# Patient Record
Sex: FEMALE | Race: WHITE | Employment: OTHER | URBAN - METROPOLITAN AREA
[De-identification: names, ages, dates, MRNs, and addresses within clinical notes are randomized per-mention and may not be internally consistent; named-entity substitution may affect disease eponyms.]

---

## 2017-01-11 ENCOUNTER — OFFICE VISIT (OUTPATIENT)
Dept: FAMILY MEDICINE CLINIC | Facility: CLINIC | Age: 77
End: 2017-01-11

## 2017-01-11 VITALS
WEIGHT: 166.38 LBS | HEART RATE: 72 BPM | HEIGHT: 57 IN | RESPIRATION RATE: 16 BRPM | BODY MASS INDEX: 35.9 KG/M2 | DIASTOLIC BLOOD PRESSURE: 70 MMHG | TEMPERATURE: 98 F | SYSTOLIC BLOOD PRESSURE: 102 MMHG

## 2017-01-11 DIAGNOSIS — Z00.00 LABORATORY EXAMINATION ORDERED AS PART OF A ROUTINE GENERAL MEDICAL EXAMINATION: ICD-10-CM

## 2017-01-11 DIAGNOSIS — M17.12 PRIMARY LOCALIZED OSTEOARTHROSIS, LOWER LEG, LEFT: ICD-10-CM

## 2017-01-11 DIAGNOSIS — R73.03 PREDIABETES: ICD-10-CM

## 2017-01-11 DIAGNOSIS — M17.0 PRIMARY OSTEOARTHRITIS OF BOTH KNEES: Primary | ICD-10-CM

## 2017-01-11 DIAGNOSIS — F41.9 ANXIETY: ICD-10-CM

## 2017-01-11 PROCEDURE — 99213 OFFICE O/P EST LOW 20 MIN: CPT | Performed by: INTERNAL MEDICINE

## 2017-01-11 RX ORDER — ALPRAZOLAM 0.5 MG/1
TABLET ORAL
Qty: 90 TABLET | Refills: 2 | Status: SHIPPED | COMMUNITY
Start: 2017-02-10 | End: 2017-05-09

## 2017-01-11 RX ORDER — MELOXICAM 15 MG/1
15 TABLET ORAL DAILY
Qty: 30 TABLET | Refills: 1 | Status: SHIPPED | COMMUNITY
Start: 2017-01-11 | End: 2017-02-27

## 2017-01-11 NOTE — PROGRESS NOTES
CC: Patient presents with:  Arthritis: joint pain, took Diclofenac and is not working       HPI:     Having a lot of pain in her knees, shoulders and arms and back. States pain is 10/10.    Occurs daily , worse with exertion   States the diclofenac do developed, well nourished,in no apparent distress, A/O x3  SKIN: no rashes,no suspicious lesions  HEENT: atraumatic, normocephalic, OP-clear, PERRLA  NECK: supple,no adenopathy  LUNGS: clear to auscultation bilaterally   CARDIO: RRR without murmur  GI: goo patient indicates understanding of these issues and agrees to the plan. Return in about 3 months (around 4/11/2017).

## 2017-01-12 ENCOUNTER — TELEPHONE (OUTPATIENT)
Dept: FAMILY MEDICINE CLINIC | Facility: CLINIC | Age: 77
End: 2017-01-12

## 2017-01-12 ENCOUNTER — LAB ENCOUNTER (OUTPATIENT)
Dept: LAB | Age: 77
End: 2017-01-12
Attending: INTERNAL MEDICINE
Payer: MEDICARE

## 2017-01-12 DIAGNOSIS — R73.03 PREDIABETES: ICD-10-CM

## 2017-01-12 DIAGNOSIS — Z00.00 LABORATORY EXAMINATION ORDERED AS PART OF A ROUTINE GENERAL MEDICAL EXAMINATION: ICD-10-CM

## 2017-01-12 LAB
ALBUMIN SERPL-MCNC: 3.9 G/DL (ref 3.5–4.8)
ALP LIVER SERPL-CCNC: 94 U/L (ref 55–142)
ALT SERPL-CCNC: 30 U/L (ref 14–54)
AST SERPL-CCNC: 23 U/L (ref 15–41)
BASOPHILS # BLD AUTO: 0.05 X10(3) UL (ref 0–0.1)
BASOPHILS NFR BLD AUTO: 0.8 %
BILIRUB SERPL-MCNC: 1.1 MG/DL (ref 0.1–2)
BUN BLD-MCNC: 15 MG/DL (ref 8–20)
CALCIUM BLD-MCNC: 9.4 MG/DL (ref 8.3–10.3)
CHLORIDE: 104 MMOL/L (ref 101–111)
CHOLEST SMN-MCNC: 215 MG/DL (ref ?–200)
CO2: 30 MMOL/L (ref 22–32)
CREAT BLD-MCNC: 0.73 MG/DL (ref 0.55–1.02)
EOSINOPHIL # BLD AUTO: 0.24 X10(3) UL (ref 0–0.3)
EOSINOPHIL NFR BLD AUTO: 3.9 %
ERYTHROCYTE [DISTWIDTH] IN BLOOD BY AUTOMATED COUNT: 12.2 % (ref 11.5–16)
EST. AVERAGE GLUCOSE BLD GHB EST-MCNC: 126 MG/DL (ref 68–126)
GLUCOSE BLD-MCNC: 113 MG/DL (ref 70–99)
HBA1C MFR BLD HPLC: 6 % (ref ?–5.7)
HCT VFR BLD AUTO: 41.5 % (ref 34–50)
HDLC SERPL-MCNC: 56 MG/DL (ref 45–?)
HDLC SERPL: 3.84 {RATIO} (ref ?–4.44)
HGB BLD-MCNC: 13.9 G/DL (ref 12–16)
IMMATURE GRANULOCYTE COUNT: 0.02 X10(3) UL (ref 0–1)
IMMATURE GRANULOCYTE RATIO %: 0.3 %
LDLC SERPL CALC-MCNC: 131 MG/DL (ref ?–130)
LYMPHOCYTES # BLD AUTO: 1.84 X10(3) UL (ref 0.9–4)
LYMPHOCYTES NFR BLD AUTO: 29.8 %
M PROTEIN MFR SERPL ELPH: 7.5 G/DL (ref 6.1–8.3)
MCH RBC QN AUTO: 31.2 PG (ref 27–33.2)
MCHC RBC AUTO-ENTMCNC: 33.5 G/DL (ref 31–37)
MCV RBC AUTO: 93.3 FL (ref 81–100)
MONOCYTES # BLD AUTO: 0.45 X10(3) UL (ref 0.1–0.6)
MONOCYTES NFR BLD AUTO: 7.3 %
NEUTROPHIL ABS PRELIM: 3.58 X10 (3) UL (ref 1.3–6.7)
NEUTROPHILS # BLD AUTO: 3.58 X10(3) UL (ref 1.3–6.7)
NEUTROPHILS NFR BLD AUTO: 57.9 %
NONHDLC SERPL-MCNC: 159 MG/DL (ref ?–130)
PLATELET # BLD AUTO: 255 10(3)UL (ref 150–450)
POTASSIUM SERPL-SCNC: 4.3 MMOL/L (ref 3.6–5.1)
RBC # BLD AUTO: 4.45 X10(6)UL (ref 3.8–5.1)
RED CELL DISTRIBUTION WIDTH-SD: 42.2 FL (ref 35.1–46.3)
SODIUM SERPL-SCNC: 141 MMOL/L (ref 136–144)
TRIGLYCERIDES: 139 MG/DL (ref ?–150)
VLDL: 28 MG/DL (ref 5–40)
WBC # BLD AUTO: 6.2 X10(3) UL (ref 4–13)

## 2017-01-12 PROCEDURE — 83036 HEMOGLOBIN GLYCOSYLATED A1C: CPT

## 2017-01-12 PROCEDURE — 80053 COMPREHEN METABOLIC PANEL: CPT

## 2017-01-12 PROCEDURE — 80061 LIPID PANEL: CPT

## 2017-01-12 PROCEDURE — 36415 COLL VENOUS BLD VENIPUNCTURE: CPT

## 2017-01-12 PROCEDURE — 85025 COMPLETE CBC W/AUTO DIFF WBC: CPT

## 2017-01-13 NOTE — PROGRESS NOTES
Quick Note:    HgbA1C is stable  Elevated cholesterol  Will discuss at upcoming [address]  ______

## 2017-01-19 ENCOUNTER — TELEPHONE (OUTPATIENT)
Dept: FAMILY MEDICINE CLINIC | Facility: CLINIC | Age: 77
End: 2017-01-19

## 2017-01-19 ENCOUNTER — LAB ENCOUNTER (OUTPATIENT)
Dept: LAB | Age: 77
End: 2017-01-19
Attending: INTERNAL MEDICINE
Payer: MEDICARE

## 2017-01-19 DIAGNOSIS — R30.0 BURNING WITH URINATION: Primary | ICD-10-CM

## 2017-01-19 DIAGNOSIS — R30.0 BURNING WITH URINATION: ICD-10-CM

## 2017-01-19 LAB
BILIRUB UR QL STRIP.AUTO: NEGATIVE
GLUCOSE UR STRIP.AUTO-MCNC: NEGATIVE MG/DL
KETONES UR STRIP.AUTO-MCNC: NEGATIVE MG/DL
NITRITE UR QL STRIP.AUTO: POSITIVE
PH UR STRIP.AUTO: 5 [PH] (ref 4.5–8)
PROT UR STRIP.AUTO-MCNC: NEGATIVE MG/DL
RBC UR QL AUTO: NEGATIVE
SP GR UR STRIP.AUTO: 1.02 (ref 1–1.03)
UROBILINOGEN UR STRIP.AUTO-MCNC: <2 MG/DL
WBC #/AREA URNS AUTO: >50 /HPF
WBC CLUMPS UR QL AUTO: PRESENT

## 2017-01-19 PROCEDURE — 87186 SC STD MICRODIL/AGAR DIL: CPT

## 2017-01-19 PROCEDURE — 87088 URINE BACTERIA CULTURE: CPT

## 2017-01-19 PROCEDURE — 81001 URINALYSIS AUTO W/SCOPE: CPT

## 2017-01-19 PROCEDURE — 87086 URINE CULTURE/COLONY COUNT: CPT

## 2017-01-19 NOTE — TELEPHONE ENCOUNTER
Patient's  is requesting urine test for s/s of UTI including burning with urination. No other symptoms reported. Patient was in office for general labs this AM and urine was collected.  Because urine was previously collected patients  declined

## 2017-01-19 NOTE — TELEPHONE ENCOUNTER
Pt came in and stated that she would like to have her urine tested. There's no order in the system and pt would like to have this test done. Please advise.  Thank you

## 2017-01-20 ENCOUNTER — TELEPHONE (OUTPATIENT)
Dept: FAMILY MEDICINE CLINIC | Facility: CLINIC | Age: 77
End: 2017-01-20

## 2017-01-20 ENCOUNTER — OFFICE VISIT (OUTPATIENT)
Dept: FAMILY MEDICINE CLINIC | Facility: CLINIC | Age: 77
End: 2017-01-20

## 2017-01-20 VITALS
HEIGHT: 57 IN | TEMPERATURE: 98 F | RESPIRATION RATE: 16 BRPM | WEIGHT: 166 LBS | SYSTOLIC BLOOD PRESSURE: 130 MMHG | DIASTOLIC BLOOD PRESSURE: 82 MMHG | BODY MASS INDEX: 35.81 KG/M2 | HEART RATE: 78 BPM

## 2017-01-20 DIAGNOSIS — N30.00 ACUTE CYSTITIS WITHOUT HEMATURIA: Primary | ICD-10-CM

## 2017-01-20 PROCEDURE — 99213 OFFICE O/P EST LOW 20 MIN: CPT | Performed by: PHYSICIAN ASSISTANT

## 2017-01-20 RX ORDER — NITROFURANTOIN 25; 75 MG/1; MG/1
100 CAPSULE ORAL 2 TIMES DAILY
Qty: 14 CAPSULE | Refills: 0 | Status: SHIPPED | OUTPATIENT
Start: 2017-01-20 | End: 2017-01-31 | Stop reason: ALTCHOICE

## 2017-01-20 RX ORDER — ESCITALOPRAM OXALATE 10 MG/1
1 TABLET ORAL DAILY
Refills: 3 | COMMUNITY
Start: 2017-01-16 | End: 2017-01-31 | Stop reason: DRUGHIGH

## 2017-01-20 RX ORDER — NITROFURANTOIN 25; 75 MG/1; MG/1
100 CAPSULE ORAL 2 TIMES DAILY
Qty: 14 CAPSULE | Refills: 0 | Status: SHIPPED | OUTPATIENT
Start: 2017-01-20 | End: 2017-01-20

## 2017-01-20 RX ORDER — DONEPEZIL HYDROCHLORIDE 5 MG/1
1 TABLET, FILM COATED ORAL DAILY
Refills: 0 | COMMUNITY
Start: 2017-01-17 | End: 2017-05-09

## 2017-01-20 NOTE — PATIENT INSTRUCTIONS
Thank you for choosing Bandar Castaneda PA-C at [place] [address]  To Do: Maya Mcfadden  1. Pt to begin antibiotics as prescribed  2. Start probiotic, VSL #3 or Align  3.  Follow-up if symptoms persist or increase    • Please signup for MY CHART, wh strive to make you healthier and to improve your quality of life.     Referrals, and Radiology Information:    If your insurance requires a referral to a specialist, please allow 5 business days to process your referral request.    If George Kirby PA-C

## 2017-01-20 NOTE — TELEPHONE ENCOUNTER
Coleen Brantley informed of what was prescribed at the visit today and that patient should also take a probiotic. Coleen Brantley will make sure to get probiotics for his mother to take.

## 2017-01-20 NOTE — PROGRESS NOTES
[facility] Internal Medicine Progress Note    CC:  Patient presents with:  Lab Results      HPI:   HPI  Urinary problems  For the past 4-5 days she has had some burning and frequency of urination  Denies any f/c/n/v/flank pain  Has not tried any murmur heard. Pulmonary/Chest: Effort normal and breath sounds normal. No respiratory distress. She has no wheezes. She has no rales. Abdominal: Soft. Bowel sounds are normal. She exhibits no distension and no mass. There is no tenderness.  There is no r

## 2017-01-22 NOTE — PROGRESS NOTES
Quick Note:    + bacteria  Pt to continue medications as prescribed and f/u if symptoms persist or increase  ______

## 2017-01-31 ENCOUNTER — OFFICE VISIT (OUTPATIENT)
Dept: FAMILY MEDICINE CLINIC | Facility: CLINIC | Age: 77
End: 2017-01-31

## 2017-01-31 ENCOUNTER — LAB ENCOUNTER (OUTPATIENT)
Dept: LAB | Age: 77
End: 2017-01-31
Attending: INTERNAL MEDICINE
Payer: MEDICARE

## 2017-01-31 VITALS
HEART RATE: 72 BPM | WEIGHT: 167.19 LBS | TEMPERATURE: 97 F | DIASTOLIC BLOOD PRESSURE: 70 MMHG | BODY MASS INDEX: 36.07 KG/M2 | SYSTOLIC BLOOD PRESSURE: 118 MMHG | HEIGHT: 57 IN | RESPIRATION RATE: 16 BRPM

## 2017-01-31 DIAGNOSIS — M17.12 PRIMARY LOCALIZED OSTEOARTHROSIS, LOWER LEG, LEFT: Primary | ICD-10-CM

## 2017-01-31 DIAGNOSIS — N30.00 ACUTE CYSTITIS WITHOUT HEMATURIA: ICD-10-CM

## 2017-01-31 DIAGNOSIS — Z09 FOLLOW-UP EXAM: ICD-10-CM

## 2017-01-31 DIAGNOSIS — M17.0 PRIMARY OSTEOARTHRITIS OF BOTH KNEES: ICD-10-CM

## 2017-01-31 LAB
BILIRUB UR QL STRIP.AUTO: NEGATIVE
GLUCOSE UR STRIP.AUTO-MCNC: NEGATIVE MG/DL
KETONES UR STRIP.AUTO-MCNC: NEGATIVE MG/DL
NITRITE UR QL STRIP.AUTO: NEGATIVE
PH UR STRIP.AUTO: 6 [PH] (ref 4.5–8)
PROT UR STRIP.AUTO-MCNC: NEGATIVE MG/DL
RBC UR QL AUTO: NEGATIVE
SP GR UR STRIP.AUTO: 1.02 (ref 1–1.03)
UROBILINOGEN UR STRIP.AUTO-MCNC: <2 MG/DL

## 2017-01-31 PROCEDURE — 87086 URINE CULTURE/COLONY COUNT: CPT

## 2017-01-31 PROCEDURE — 81001 URINALYSIS AUTO W/SCOPE: CPT

## 2017-01-31 PROCEDURE — 99213 OFFICE O/P EST LOW 20 MIN: CPT | Performed by: INTERNAL MEDICINE

## 2017-01-31 RX ORDER — HYDROCODONE BITARTRATE AND ACETAMINOPHEN 5; 325 MG/1; MG/1
1 TABLET ORAL EVERY 6 HOURS PRN
Qty: 20 TABLET | Refills: 0 | Status: SHIPPED | OUTPATIENT
Start: 2017-01-31 | End: 2017-05-09

## 2017-01-31 RX ORDER — ESCITALOPRAM OXALATE 5 MG/1
5 TABLET ORAL DAILY
COMMUNITY
End: 2020-08-17

## 2017-01-31 NOTE — PROGRESS NOTES
CC: Patient presents with:  Medication Follow-Up: Meloxicam works on and off - Patient declined flu vaccine. HPI:     Here for follow up, continues to struggle with significant knee pain. Continues to get recurrent swelling of that knee.  Does not abdominal pain    EXAM:   /70 mmHg  Pulse 72  Temp(Src) 97.4 °F (36.3 °C) (Temporal)  Resp 16  Ht 57\"  Wt 167 lb 3.2 oz  BMI 36.17 kg/m2  GENERAL: well developed, well nourished,in no apparent distress, A/O x3  SKIN: no rashes,no suspicious lesions

## 2017-03-01 NOTE — TELEPHONE ENCOUNTER
Requesting meloxicam   LOV: 1/31/17  RTC: 3 months  Last Labs:   Filled: 1/11/17 #30 with 1 refills    No future appointments.

## 2017-03-02 RX ORDER — MELOXICAM 15 MG/1
TABLET ORAL
Qty: 30 TABLET | Refills: 2 | Status: SHIPPED | OUTPATIENT
Start: 2017-03-02 | End: 2017-06-19

## 2017-04-01 ENCOUNTER — HOSPITAL ENCOUNTER (EMERGENCY)
Age: 77
Discharge: HOME | End: 2017-04-01
Attending: EMERGENCY MEDICINE
Payer: MEDICARE

## 2017-04-01 VITALS
SYSTOLIC BLOOD PRESSURE: 144 MMHG | HEIGHT: 60 IN | BODY MASS INDEX: 32.79 KG/M2 | WEIGHT: 167 LBS | OXYGEN SATURATION: 96 % | HEART RATE: 72 BPM | RESPIRATION RATE: 18 BRPM | TEMPERATURE: 98 F | DIASTOLIC BLOOD PRESSURE: 62 MMHG

## 2017-04-01 DIAGNOSIS — N30.00 ACUTE CYSTITIS WITHOUT HEMATURIA: Primary | ICD-10-CM

## 2017-04-01 PROCEDURE — 99283 EMERGENCY DEPT VISIT LOW MDM: CPT

## 2017-04-01 PROCEDURE — 87086 URINE CULTURE/COLONY COUNT: CPT | Performed by: EMERGENCY MEDICINE

## 2017-04-01 PROCEDURE — 81001 URINALYSIS AUTO W/SCOPE: CPT | Performed by: EMERGENCY MEDICINE

## 2017-04-01 RX ORDER — CEPHALEXIN 500 MG/1
500 CAPSULE ORAL 2 TIMES DAILY
Qty: 14 CAPSULE | Refills: 0 | Status: SHIPPED | OUTPATIENT
Start: 2017-04-01 | End: 2017-04-08

## 2017-04-01 NOTE — ED PROVIDER NOTES
Patient Seen in: THE [facility] In [place]    History   Patient presents with:  Urinary Symptoms (urologic)    Stated Complaint: uti s/s    HPI    66-year-old female here with her  for evaluation of dysuria for the last 3 days.   No Alcohol Use: No                Review of Systems    Positive for stated complaint: uti s/s  Other systems are as noted in HPI. Constitutional and vital signs reviewed. All other systems reviewed and negative except as noted above.     PSFH element limits   URINE CULTURE, ROUTINE       MDM   Well-appearing, well-hydrated, tolerating p.o., no abdominal tenderness. UA does suggest infection. She has been here several times for this.   I did review prior cultures, and I will place her on Keflex for 7 d

## 2017-04-24 ENCOUNTER — TELEPHONE (OUTPATIENT)
Dept: FAMILY MEDICINE CLINIC | Facility: CLINIC | Age: 77
End: 2017-04-24

## 2017-04-24 DIAGNOSIS — Z12.31 ENCOUNTER FOR SCREENING MAMMOGRAM FOR MALIGNANT NEOPLASM OF BREAST: Primary | ICD-10-CM

## 2017-04-24 NOTE — TELEPHONE ENCOUNTER
Requesting mammogram  LOV: 1/31/17  RTC: 3 months  Last Labs: 5/17/16 last mammo    No future appointments. Notes Recorded by Carlene Garcia MD on 5/17/2016 at 4:49 PM  Normal mammogram  Follow up 1 year.     Patient's family member informed order is placed

## 2017-05-05 RX ORDER — ALPRAZOLAM 0.5 MG/1
TABLET ORAL
Qty: 90 TABLET | Refills: 2 | Status: CANCELLED | OUTPATIENT
Start: 2017-05-05

## 2017-05-05 NOTE — TELEPHONE ENCOUNTER
Requesting Alprazolam  LOV: 1/3/17  RTC: 3 months  Last Labs: n/  Filled: 2/10/17 #90 with 2 refills    Future Appointments  Date Time Provider Shayy Rajput   5/16/2017 7:40 AM PF [facility] RM1 PF [facility]       Last filled 4/8/17 #90 for 30 days fo

## 2017-05-09 NOTE — PROGRESS NOTES
CC: Patient presents with:  Medication Follow-Up: Alprazolam       HPI:     Arthritis   Struggling with arthritis pain   Takes meloxicam most days   Some days needs additional tylenol       Anxiety   Struggling with anxiety and still feels that she nee pain    EXAM:   /62 mmHg  Pulse 80  Temp(Src) 97.7 °F (36.5 °C) (Temporal)  Resp 16  Ht 57\"  Wt 166 lb 6.4 oz  BMI 36.00 kg/m2  GENERAL: well developed, well nourished,in no apparent distress, A/O x3  SKIN: no rashes,no suspicious lesions  HEENT: at medication    Gastroesophageal reflux disease without esophagitis  -continue pantoprazole   -advised of risks of long ter use of this medication     Primary osteoarthritis of both knees  -continue meloxicam   -advised of risks of long term use of this medi

## 2017-05-16 ENCOUNTER — HOSPITAL ENCOUNTER (OUTPATIENT)
Dept: MAMMOGRAPHY | Age: 77
Discharge: HOME | End: 2017-05-16
Attending: INTERNAL MEDICINE
Payer: MEDICARE

## 2017-05-16 DIAGNOSIS — Z12.31 ENCOUNTER FOR SCREENING MAMMOGRAM FOR MALIGNANT NEOPLASM OF BREAST: Primary | ICD-10-CM

## 2017-05-16 DIAGNOSIS — Z12.31 ENCOUNTER FOR SCREENING MAMMOGRAM FOR MALIGNANT NEOPLASM OF BREAST: ICD-10-CM

## 2017-05-16 PROCEDURE — 77067 SCR MAMMO BI INCL CAD: CPT | Performed by: INTERNAL MEDICINE

## 2017-06-19 ENCOUNTER — TELEPHONE (OUTPATIENT)
Dept: FAMILY MEDICINE CLINIC | Facility: CLINIC | Age: 77
End: 2017-06-19

## 2017-06-19 RX ORDER — MELOXICAM 15 MG/1
15 TABLET ORAL
Qty: 90 TABLET | Refills: 3 | Status: SHIPPED | OUTPATIENT
Start: 2017-06-19 | End: 2017-07-11

## 2017-07-11 ENCOUNTER — OFFICE VISIT (OUTPATIENT)
Dept: FAMILY MEDICINE CLINIC | Facility: CLINIC | Age: 77
End: 2017-07-11

## 2017-07-11 VITALS
HEIGHT: 57 IN | RESPIRATION RATE: 16 BRPM | DIASTOLIC BLOOD PRESSURE: 76 MMHG | HEART RATE: 80 BPM | BODY MASS INDEX: 36.68 KG/M2 | WEIGHT: 170 LBS | TEMPERATURE: 98 F | SYSTOLIC BLOOD PRESSURE: 140 MMHG

## 2017-07-11 DIAGNOSIS — R06.02 SHORTNESS OF BREATH: ICD-10-CM

## 2017-07-11 DIAGNOSIS — M79.89 LEG SWELLING: Primary | ICD-10-CM

## 2017-07-11 DIAGNOSIS — R10.13 EPIGASTRIC PAIN: ICD-10-CM

## 2017-07-11 PROCEDURE — 99214 OFFICE O/P EST MOD 30 MIN: CPT | Performed by: PHYSICIAN ASSISTANT

## 2017-07-11 NOTE — PROGRESS NOTES
[facility] Internal Medicine Progress Note    CC:  Patient presents with:  Edema: both feet x couple weeks       HPI:   HPI  Swelling in both feet for a couple of weeks  Pt states sometimes the swelling is better, other days it is worse  Elevatin Cuff Size: adult)   Pulse 80   Temp 97.8 °F (36.6 °C) (Temporal)   Resp 16   Ht 57\"   Wt 170 lb   BMI 36.79 kg/m²  Body mass index is 36.79 kg/m².   Physical Exam   Constitutional: She is oriented to person, place, and time and well-developed, well-nourish (CPT=93306)  US ABDOMEN COMPLETE (CPT=76700)  US VENOUS DOPPLER LEG BILAT - DIAG IMG (CPT=93970)     Patient/Caregiver Education: Patient/Caregiver Education: There are no barriers to learning. Medical education done.  Outcome: Patient verbalizes understand

## 2017-07-11 NOTE — PATIENT INSTRUCTIONS
Thank you for choosing Susan Gamboa PA-C at [place] [address]  To Do: Maya Mcfadden  1. Pt to get lab work done  2. Pt to get imaging done  3.  Follow-up in 1 month, sooner if problems  Effective 6/19/17 until November 2017  Due to Construction have potential risk of harm or side effects or medication interactions.  It is your duty and for your safety to discuss with the pharmacist and our office with questions, and to notify us and stop treatment if problems arise, but know that our intention is

## 2017-07-12 ENCOUNTER — TELEPHONE (OUTPATIENT)
Dept: FAMILY MEDICINE CLINIC | Facility: CLINIC | Age: 77
End: 2017-07-12

## 2017-07-12 DIAGNOSIS — M79.89 LEG SWELLING: Primary | ICD-10-CM

## 2017-07-12 DIAGNOSIS — R06.02 SHORTNESS OF BREATH: ICD-10-CM

## 2017-07-12 NOTE — TELEPHONE ENCOUNTER
Future Appointments  Date Time Provider Shayy Rajput   7/13/2017 9:00 AM PF [facility] PF OUTPT LAB [place]   7/13/2017 9:30 AM PF US RM3 PF US [place]   7/13/2017 10:30 AM PF US RM3 PF US [place]   8/3/2017 7:00 AM PF CARD STRESS ECHO RM 1 PF

## 2017-07-12 NOTE — TELEPHONE ENCOUNTER
Alma Cha called back. Patient's spouse will be driving her to testing. Need to keep it in [place]. I explained we will talk to the provider and see if we can change order to STAT to expedite.    She said we are to call her father to set up - he will be a

## 2017-07-13 ENCOUNTER — HOSPITAL ENCOUNTER (OUTPATIENT)
Dept: ULTRASOUND IMAGING | Age: 77
Discharge: HOME | End: 2017-07-13
Attending: PHYSICIAN ASSISTANT
Payer: MEDICARE

## 2017-07-13 ENCOUNTER — LAB ENCOUNTER (OUTPATIENT)
Dept: LAB | Age: 77
End: 2017-07-13
Attending: PHYSICIAN ASSISTANT
Payer: MEDICARE

## 2017-07-13 ENCOUNTER — PRIOR ORIGINAL RECORDS (OUTPATIENT)
Dept: OTHER | Age: 77
End: 2017-07-13

## 2017-07-13 DIAGNOSIS — R06.02 SHORTNESS OF BREATH: ICD-10-CM

## 2017-07-13 DIAGNOSIS — Z13.220 SCREENING FOR LIPID DISORDERS: ICD-10-CM

## 2017-07-13 DIAGNOSIS — R73.03 PREDIABETES: ICD-10-CM

## 2017-07-13 DIAGNOSIS — M79.89 LEG SWELLING: ICD-10-CM

## 2017-07-13 DIAGNOSIS — R10.13 EPIGASTRIC PAIN: ICD-10-CM

## 2017-07-13 LAB
ALBUMIN SERPL-MCNC: 3.8 G/DL (ref 3.5–4.8)
ALP LIVER SERPL-CCNC: 82 U/L (ref 55–142)
ALT SERPL-CCNC: 27 U/L (ref 14–54)
AST SERPL-CCNC: 25 U/L (ref 15–41)
BASOPHILS # BLD AUTO: 0.05 X10(3) UL (ref 0–0.1)
BASOPHILS NFR BLD AUTO: 1 %
BETA NATRIURETIC PEPTIDE: 109 PG/ML (ref 2–99)
BILIRUB SERPL-MCNC: 1.4 MG/DL (ref 0.1–2)
BUN BLD-MCNC: 13 MG/DL (ref 8–20)
CALCIUM BLD-MCNC: 9.3 MG/DL (ref 8.3–10.3)
CHLORIDE: 105 MMOL/L (ref 101–111)
CHOLEST SMN-MCNC: 210 MG/DL (ref ?–200)
CO2: 30 MMOL/L (ref 22–32)
CREAT BLD-MCNC: 0.63 MG/DL (ref 0.55–1.02)
EOSINOPHIL # BLD AUTO: 0.17 X10(3) UL (ref 0–0.3)
EOSINOPHIL NFR BLD AUTO: 3.4 %
ERYTHROCYTE [DISTWIDTH] IN BLOOD BY AUTOMATED COUNT: 12.5 % (ref 11.5–16)
EST. AVERAGE GLUCOSE BLD GHB EST-MCNC: 123 MG/DL (ref 68–126)
FREE T4: 1.2 NG/DL (ref 0.9–1.8)
GLUCOSE BLD-MCNC: 101 MG/DL (ref 70–99)
HBA1C MFR BLD HPLC: 5.9 % (ref ?–5.7)
HCT VFR BLD AUTO: 41.6 % (ref 34–50)
HDLC SERPL-MCNC: 63 MG/DL (ref 45–?)
HDLC SERPL: 3.33 {RATIO} (ref ?–4.44)
HGB BLD-MCNC: 13.4 G/DL (ref 12–16)
IMMATURE GRANULOCYTE COUNT: 0.01 X10(3) UL (ref 0–1)
IMMATURE GRANULOCYTE RATIO %: 0.2 %
LDLC SERPL CALC-MCNC: 124 MG/DL (ref ?–130)
LYMPHOCYTES # BLD AUTO: 1.77 X10(3) UL (ref 0.9–4)
LYMPHOCYTES NFR BLD AUTO: 35.7 %
M PROTEIN MFR SERPL ELPH: 7.4 G/DL (ref 6.1–8.3)
MCH RBC QN AUTO: 29.8 PG (ref 27–33.2)
MCHC RBC AUTO-ENTMCNC: 32.2 G/DL (ref 31–37)
MCV RBC AUTO: 92.4 FL (ref 81–100)
MONOCYTES # BLD AUTO: 0.48 X10(3) UL (ref 0.1–0.6)
MONOCYTES NFR BLD AUTO: 9.7 %
NEUTROPHIL ABS PRELIM: 2.48 X10 (3) UL (ref 1.3–6.7)
NEUTROPHILS # BLD AUTO: 2.48 X10(3) UL (ref 1.3–6.7)
NEUTROPHILS NFR BLD AUTO: 50 %
NONHDLC SERPL-MCNC: 147 MG/DL (ref ?–130)
PLATELET # BLD AUTO: 216 10(3)UL (ref 150–450)
POTASSIUM SERPL-SCNC: 4.3 MMOL/L (ref 3.6–5.1)
RBC # BLD AUTO: 4.5 X10(6)UL (ref 3.8–5.1)
RED CELL DISTRIBUTION WIDTH-SD: 42.3 FL (ref 35.1–46.3)
SODIUM SERPL-SCNC: 141 MMOL/L (ref 136–144)
TRIGLYCERIDES: 117 MG/DL (ref ?–150)
TSI SER-ACNC: 0.47 MIU/ML (ref 0.35–5.5)
VLDL: 23 MG/DL (ref 5–40)
WBC # BLD AUTO: 5 X10(3) UL (ref 4–13)

## 2017-07-13 PROCEDURE — 83880 ASSAY OF NATRIURETIC PEPTIDE: CPT

## 2017-07-13 PROCEDURE — 80053 COMPREHEN METABOLIC PANEL: CPT

## 2017-07-13 PROCEDURE — 36415 COLL VENOUS BLD VENIPUNCTURE: CPT

## 2017-07-13 PROCEDURE — 83036 HEMOGLOBIN GLYCOSYLATED A1C: CPT

## 2017-07-13 PROCEDURE — 84439 ASSAY OF FREE THYROXINE: CPT

## 2017-07-13 PROCEDURE — 76700 US EXAM ABDOM COMPLETE: CPT | Performed by: PHYSICIAN ASSISTANT

## 2017-07-13 PROCEDURE — 84443 ASSAY THYROID STIM HORMONE: CPT

## 2017-07-13 PROCEDURE — 85025 COMPLETE CBC W/AUTO DIFF WBC: CPT

## 2017-07-13 PROCEDURE — 93970 EXTREMITY STUDY: CPT | Performed by: PHYSICIAN ASSISTANT

## 2017-07-13 PROCEDURE — 80061 LIPID PANEL: CPT

## 2017-07-13 NOTE — TELEPHONE ENCOUNTER
Patient's spouse informed that echo has now been ordered as stat and they need to call scheduling to set up. He will call or have his daughter call.

## 2017-07-13 NOTE — TELEPHONE ENCOUNTER
Per Loren orantes to order echo as stat. New order submitted as stat. Will need to inform patient's spouse after 1pm today so he can call and get scheduled asap.     Time started: 2545    Time ended: 0941    Total time spent on chart: 4 min

## 2017-07-14 DIAGNOSIS — R06.02 SHORTNESS OF BREATH: ICD-10-CM

## 2017-07-14 DIAGNOSIS — M79.89 LEG SWELLING: ICD-10-CM

## 2017-07-14 DIAGNOSIS — M79.89 SWELLING OF LEFT LOWER EXTREMITY: Primary | ICD-10-CM

## 2017-07-14 RX ORDER — FUROSEMIDE 20 MG/1
20 TABLET ORAL EVERY MORNING
Qty: 30 TABLET | Refills: 2 | Status: SHIPPED | OUTPATIENT
Start: 2017-07-14 | End: 2017-09-07

## 2017-07-14 NOTE — PROGRESS NOTES
BNP is a little elevated  We can start furosemide 20mg daily for LE swelling  Repeat BNP and CMP in 2 weeks and follow-up

## 2017-07-15 ENCOUNTER — LAB ENCOUNTER (OUTPATIENT)
Dept: LAB | Age: 77
End: 2017-07-15
Attending: PHYSICIAN ASSISTANT
Payer: MEDICARE

## 2017-07-15 ENCOUNTER — PRIOR ORIGINAL RECORDS (OUTPATIENT)
Dept: OTHER | Age: 77
End: 2017-07-15

## 2017-07-15 DIAGNOSIS — M79.89 LEG SWELLING: ICD-10-CM

## 2017-07-15 DIAGNOSIS — M79.89 SWELLING OF LEFT LOWER EXTREMITY: ICD-10-CM

## 2017-07-15 DIAGNOSIS — R06.02 SHORTNESS OF BREATH: ICD-10-CM

## 2017-07-15 LAB
ALBUMIN SERPL-MCNC: 3.7 G/DL (ref 3.5–4.8)
ALP LIVER SERPL-CCNC: 85 U/L (ref 55–142)
ALT SERPL-CCNC: 28 U/L (ref 14–54)
AST SERPL-CCNC: 26 U/L (ref 15–41)
BETA NATRIURETIC PEPTIDE: 79 PG/ML (ref 2–99)
BILIRUB SERPL-MCNC: 1.1 MG/DL (ref 0.1–2)
BUN BLD-MCNC: 13 MG/DL (ref 8–20)
CALCIUM BLD-MCNC: 9.1 MG/DL (ref 8.3–10.3)
CHLORIDE: 105 MMOL/L (ref 101–111)
CO2: 30 MMOL/L (ref 22–32)
CREAT BLD-MCNC: 0.66 MG/DL (ref 0.55–1.02)
GLUCOSE BLD-MCNC: 104 MG/DL (ref 70–99)
M PROTEIN MFR SERPL ELPH: 7.1 G/DL (ref 6.1–8.3)
POTASSIUM SERPL-SCNC: 4.3 MMOL/L (ref 3.6–5.1)
SODIUM SERPL-SCNC: 142 MMOL/L (ref 136–144)

## 2017-07-15 PROCEDURE — 80053 COMPREHEN METABOLIC PANEL: CPT

## 2017-07-15 PROCEDURE — 83880 ASSAY OF NATRIURETIC PEPTIDE: CPT

## 2017-07-15 PROCEDURE — 36415 COLL VENOUS BLD VENIPUNCTURE: CPT

## 2017-07-18 ENCOUNTER — HOSPITAL ENCOUNTER (OUTPATIENT)
Dept: CV DIAGNOSTICS | Age: 77
Discharge: HOME | End: 2017-07-18
Attending: PHYSICIAN ASSISTANT
Payer: MEDICARE

## 2017-07-18 DIAGNOSIS — R06.02 SHORTNESS OF BREATH: ICD-10-CM

## 2017-07-18 DIAGNOSIS — M79.89 LEG SWELLING: ICD-10-CM

## 2017-07-18 PROCEDURE — 93306 TTE W/DOPPLER COMPLETE: CPT | Performed by: PHYSICIAN ASSISTANT

## 2017-07-24 ENCOUNTER — TELEPHONE (OUTPATIENT)
Dept: FAMILY MEDICINE CLINIC | Facility: CLINIC | Age: 77
End: 2017-07-24

## 2017-07-24 NOTE — TELEPHONE ENCOUNTER
came into the office this morning looking for results of cardiac echo doppler.  informed that Gisela Savage is out of the office today and the results will be reviewed tomorrow. Please review results.

## 2017-07-25 ENCOUNTER — TELEPHONE (OUTPATIENT)
Dept: FAMILY MEDICINE CLINIC | Facility: CLINIC | Age: 77
End: 2017-07-25

## 2017-07-25 ENCOUNTER — HOSPITAL ENCOUNTER (OUTPATIENT)
Dept: GENERAL RADIOLOGY | Age: 77
Discharge: HOME | End: 2017-07-25
Attending: INTERNAL MEDICINE
Payer: MEDICARE

## 2017-07-25 DIAGNOSIS — R09.89 RALES: ICD-10-CM

## 2017-07-25 DIAGNOSIS — R60.0 PEDAL EDEMA: ICD-10-CM

## 2017-07-25 DIAGNOSIS — R09.89 RALES: Primary | ICD-10-CM

## 2017-07-25 PROCEDURE — 71020 XR CHEST PA + LAT CHEST (CPT=71020): CPT | Performed by: INTERNAL MEDICINE

## 2017-07-25 NOTE — TELEPHONE ENCOUNTER
Daughter called back and provided information that when pt saw her neurologist told her that pt had water in her lungs and daughter was wondering if this was tested or if when pt was assessed was this found. Please contact daughter Jordan Knox.

## 2017-07-25 NOTE — PROGRESS NOTES
Echo shows age related changes  Systolic pressure is a high, I would recommend getting a sleep study done

## 2017-07-25 NOTE — TELEPHONE ENCOUNTER
Rcvd consult notes from neurologist Dr. Nicol Serna dated 7/12/17. States that Dr heard rales at pulmonary bases and is concerned with CHF. Daughter Nakul Ashford is concerned and was wondering if patient needs a CXR.     Spoke with Dr. Brad Zapata who states that chiquitai

## 2017-07-26 NOTE — TELEPHONE ENCOUNTER
It looks like Dr. Granados Held ordered CXR to better assess, when I saw pt on exam her lungs sided fine.   EF on exam was ok

## 2017-08-16 ENCOUNTER — TELEPHONE (OUTPATIENT)
Dept: FAMILY MEDICINE CLINIC | Facility: CLINIC | Age: 77
End: 2017-08-16

## 2017-08-16 ENCOUNTER — HOSPITAL ENCOUNTER (OUTPATIENT)
Dept: CT IMAGING | Age: 77
Discharge: HOME | End: 2017-08-16
Attending: INTERNAL MEDICINE
Payer: MEDICARE

## 2017-08-16 DIAGNOSIS — R06.00 DYSPNEA, UNSPECIFIED TYPE: ICD-10-CM

## 2017-08-16 PROCEDURE — 71250 CT THORAX DX C-: CPT | Performed by: INTERNAL MEDICINE

## 2017-08-16 NOTE — TELEPHONE ENCOUNTER
Valerio Stevenson to notify patient that appointment on 09- has been cancelled and to call back to reschedule her lizandro on 09/07/2017. Letter sent in mail.

## 2017-08-23 ENCOUNTER — PRIOR ORIGINAL RECORDS (OUTPATIENT)
Dept: OTHER | Age: 77
End: 2017-08-23

## 2017-08-23 ENCOUNTER — LAB ENCOUNTER (OUTPATIENT)
Dept: LAB | Age: 77
End: 2017-08-23
Attending: INTERNAL MEDICINE
Payer: MEDICARE

## 2017-08-23 DIAGNOSIS — J84.10 PULMONARY FIBROSIS (HCC): ICD-10-CM

## 2017-08-23 LAB
ANA SCREEN: POSITIVE
CENTROMERE AUTOAB: <100 AU/ML (ref ?–100)
DSDNA AUTOAB: <100 IU/ML (ref ?–100)
HISTONE AUTOAB: <100 AU/ML (ref ?–100)
JO-1 AUTOAB: 160 AU/ML (ref ?–100)
RHEUMATOID FACT SERPL-ACNC: <10 IU/ML (ref ?–15)
RNP AUTOAB: <100 AU/ML (ref ?–100)
SCL-70 AUTOAB: <100 AU/ML (ref ?–100)
SED RATE-ML: 23 MM/HR (ref 0–25)
SM AUTOAB (SMITH): <100 AU/ML (ref ?–100)
SSA AUTOAB: <100 AU/ML (ref ?–100)
SSB AUTOAB: <100 AU/ML (ref ?–100)

## 2017-08-23 PROCEDURE — 86256 FLUORESCENT ANTIBODY TITER: CPT

## 2017-08-23 PROCEDURE — 86235 NUCLEAR ANTIGEN ANTIBODY: CPT

## 2017-08-23 PROCEDURE — 86255 FLUORESCENT ANTIBODY SCREEN: CPT

## 2017-08-23 PROCEDURE — 86431 RHEUMATOID FACTOR QUANT: CPT

## 2017-08-23 PROCEDURE — 85652 RBC SED RATE AUTOMATED: CPT

## 2017-08-23 PROCEDURE — 86038 ANTINUCLEAR ANTIBODIES: CPT

## 2017-08-23 PROCEDURE — 86225 DNA ANTIBODY NATIVE: CPT

## 2017-08-23 PROCEDURE — 36415 COLL VENOUS BLD VENIPUNCTURE: CPT

## 2017-08-23 PROCEDURE — 83876 ASSAY MYELOPEROXIDASE: CPT

## 2017-08-23 PROCEDURE — 86200 CCP ANTIBODY: CPT

## 2017-08-23 PROCEDURE — 83516 IMMUNOASSAY NONANTIBODY: CPT

## 2017-08-24 LAB — CYCLIC CITRULLINATED PEPTIDE: 18 UNITS

## 2017-08-25 LAB
MYELOPEROX ANTIBODIES, IGG: 5 AU/ML
SERINE PROTEASE3, IGG: 0 AU/ML

## 2017-09-01 ENCOUNTER — PRIOR ORIGINAL RECORDS (OUTPATIENT)
Dept: OTHER | Age: 77
End: 2017-09-01

## 2017-09-07 ENCOUNTER — OFFICE VISIT (OUTPATIENT)
Dept: FAMILY MEDICINE CLINIC | Facility: CLINIC | Age: 77
End: 2017-09-07

## 2017-09-07 VITALS
WEIGHT: 166 LBS | HEIGHT: 57 IN | SYSTOLIC BLOOD PRESSURE: 110 MMHG | TEMPERATURE: 98 F | DIASTOLIC BLOOD PRESSURE: 70 MMHG | RESPIRATION RATE: 18 BRPM | HEART RATE: 80 BPM | BODY MASS INDEX: 35.81 KG/M2

## 2017-09-07 DIAGNOSIS — R76.8 POSITIVE ANA (ANTINUCLEAR ANTIBODY): ICD-10-CM

## 2017-09-07 DIAGNOSIS — M79.89 SWELLING OF LEFT LOWER EXTREMITY: ICD-10-CM

## 2017-09-07 DIAGNOSIS — F41.9 ANXIETY: ICD-10-CM

## 2017-09-07 DIAGNOSIS — Z00.00 MEDICARE ANNUAL WELLNESS VISIT, SUBSEQUENT: Primary | ICD-10-CM

## 2017-09-07 PROCEDURE — G0439 PPPS, SUBSEQ VISIT: HCPCS | Performed by: INTERNAL MEDICINE

## 2017-09-07 PROCEDURE — 99213 OFFICE O/P EST LOW 20 MIN: CPT | Performed by: INTERNAL MEDICINE

## 2017-09-07 RX ORDER — FUROSEMIDE 40 MG/1
40 TABLET ORAL EVERY MORNING
COMMUNITY
Start: 2017-09-07 | End: 2018-07-11 | Stop reason: DRUGHIGH

## 2017-09-07 NOTE — PROGRESS NOTES
Grace Bhagat is a 64year old female who presents for a Medicare Annual Wellness visit.     Saw Dr. Che Spine and states heart is very good   Was recommend for dose increase in furosemide to 40     Reviewed pulmonary work up   Anxiety has been doing we 07/13/2017   CHOLEST 215 (H) 01/12/2017   CHOLEST 206 (H) 07/25/2016       Lab Results  Component Value Date   HDL 63 07/13/2017   HDL 56 01/12/2017   HDL 56 07/25/2016       Lab Results  Component Value Date   TRIG 117 07/13/2017   TRIG 139 01/12/2017   T Functional Status     Hearing Problems?: No    Vision Problems? : No    Difficulty walking?: Yes    Difficulty dressing or bathing?: No    Problems with daily activities? : Yes    Memory Problems?: Yes      Fall/Risk Assessment     Do you have 3 or mor (mg/dL)   Date Value   03/27/2012 93     LDL Cholesterol (mg/dL)   Date Value   07/13/2017 124        EKG - w/ Initial Preventative Physical Exam only, or if medically necessary  n/a       Colorectal Cancer Screening      Colonoscopy Screen every 10 years Potassium  Annually Potassium (mmol/L)   Date Value   07/15/2017 4.3   12/11/2012 4.4     POTASSIUM (mmol/L)   Date Value   06/12/2014 3.8    No flowsheet data found.     Creatinine  Annually Creatinine, Serum (mg/dL)   Date Value   11/12/2014 0.62     Crea essential hypertension       Past Surgical History:  : APPENDECTOMY  No date: APPENDECTOMY  No date:       Comment: 2  No date: CHOLECYSTECTOMY      Comment: 1978: HERNIA SURGERY      Comment: inguinal  : HYSTERECTOMY  No date: Terri Zhang adenopathy, no bruits  CHEST: no chest tenderness  BREAST: no masses, no discharge or no axillary lymphadenopathy   LUNGS: clear to auscultation  CARDIO: RRR without murmur  GI: good BS's, no masses, HSM or tenderness  : deferred  RECTAL: deferred  MUSCU

## 2017-09-11 LAB
ALBUMIN: 3.7 G/DL
ALBUMIN: 3.8 G/DL
ALKALINE PHOSPHATATE(ALK PHOS): 82 IU/L
ALKALINE PHOSPHATATE(ALK PHOS): 85 IU/L
BILIRUBIN TOTAL: 1.1 MG/DL
BILIRUBIN TOTAL: 1.4 MG/DL
BNP: 109 PMOL/L
BNP: 79 PMOL/L
BUN: 13 MG/DL
BUN: 13 MG/DL
CALCIUM: 9.1 MG/DL
CALCIUM: 9.3 MG/DL
CHLORIDE: 105 MEQ/L
CHLORIDE: 105 MEQ/L
CHOLESTEROL, TOTAL: 210 MG/DL
CREATININE, SERUM: 0.63 MG/DL
CREATININE, SERUM: 0.66 MG/DL
ESR (SED RATE): 23 MM/HR
FREE T4: 1.2 MG/DL
GLUCOSE: 101 MG/DL
GLUCOSE: 104 MG/DL
HDL CHOLESTEROL: 63 MG/DL
HEMATOCRIT: 41.6 %
HEMOGLOBIN A1C: 5.9 %
HEMOGLOBIN: 13.4 G/DL
LDL CHOLESTEROL: 124 MG/DL
PLATELETS: 216 K/UL
POTASSIUM, SERUM: 4.3 MEQ/L
POTASSIUM, SERUM: 4.3 MEQ/L
PROTEIN, TOTAL: 7.1 G/DL
PROTEIN, TOTAL: 7.4 G/DL
RED BLOOD COUNT: 4.5 X 10-6/U
SGOT (AST): 25 IU/L
SGOT (AST): 26 IU/L
SGPT (ALT): 27 IU/L
SGPT (ALT): 28 IU/L
SODIUM: 141 MEQ/L
SODIUM: 142 MEQ/L
THYROID STIMULATING HORMONE: 0.47 MLU/L
TRIGLYCERIDES: 117 MG/DL
WHITE BLOOD COUNT: 5 X 10-3/U

## 2017-11-03 NOTE — TELEPHONE ENCOUNTER
Pt states she does not have any refills left    Name of Medication:ALPRAZolam 0.5 MG Oral Tab       Dose:     How is medication prescribed:    Specific name of pharmacy and location:[id number] [address] [phone], [phone]

## 2017-11-03 NOTE — TELEPHONE ENCOUNTER
Requesting Alprazolam   LOV: 9/7/17  RTC: 6 mo   Last Relevant Labs: n/a   Filled: 5/9/17 #90 with 5 refills    Future Appointments  Date Time Provider Shayy Rajput   3/7/2018 8:30 AM Don Darling MD [facility] [address] [facility] [address]       Last filled per [place]  10/

## 2017-11-06 ENCOUNTER — TELEPHONE (OUTPATIENT)
Dept: FAMILY MEDICINE CLINIC | Facility: CLINIC | Age: 77
End: 2017-11-06

## 2017-11-06 RX ORDER — ALPRAZOLAM 0.5 MG/1
0.5 TABLET ORAL 3 TIMES DAILY PRN
Qty: 90 TABLET | Refills: 4 | Status: SHIPPED
Start: 2017-11-06 | End: 2018-04-19

## 2017-11-06 NOTE — TELEPHONE ENCOUNTER
Spouse on the phone. Would like to know when this medication will be refill. Will not hang up until getting an answer.

## 2017-11-06 NOTE — TELEPHONE ENCOUNTER
Script faxed, confirmation received. Discussed with patient's  office refill policy and requested he ask for refills more in advance so patient does not run out of medication.  There is a significant language barrier and RN unable to confirm patient'

## 2017-11-06 NOTE — TELEPHONE ENCOUNTER
Spoke to pts  and daughter and informed both that RX was called into [facility] and daughter states, \"your office finally does their job\", apologized to pts daughter and informed of refill policy, daughter proceeded to disconnect call.     Pre

## 2018-02-14 ENCOUNTER — OFFICE VISIT (OUTPATIENT)
Dept: FAMILY MEDICINE CLINIC | Facility: CLINIC | Age: 78
End: 2018-02-14

## 2018-02-14 ENCOUNTER — TELEPHONE (OUTPATIENT)
Dept: FAMILY MEDICINE CLINIC | Facility: CLINIC | Age: 78
End: 2018-02-14

## 2018-02-14 VITALS
WEIGHT: 171 LBS | HEART RATE: 73 BPM | DIASTOLIC BLOOD PRESSURE: 82 MMHG | SYSTOLIC BLOOD PRESSURE: 142 MMHG | BODY MASS INDEX: 37 KG/M2 | TEMPERATURE: 98 F

## 2018-02-14 DIAGNOSIS — K62.5 RECTAL BLEEDING: Primary | ICD-10-CM

## 2018-02-14 DIAGNOSIS — K64.9 HEMORRHOIDS, UNSPECIFIED HEMORRHOID TYPE: ICD-10-CM

## 2018-02-14 PROCEDURE — 99213 OFFICE O/P EST LOW 20 MIN: CPT | Performed by: NURSE PRACTITIONER

## 2018-02-14 NOTE — PROGRESS NOTES
[facility] Internal Medicine Office Note  Chief Complaint:   Patient presents with:  Rectal Bleeding: Bright red started today  Abdominal Pain: started today    HPI:   This is a 76year old female coming in for  HPI   C/o of bright red blood from for cough and shortness of breath. Cardiovascular: Negative for chest pain. Gastrointestinal: Positive for abdominal pain and anal bleeding. Negative for nausea, vomiting, diarrhea, constipation, blood in stool, abdominal distention and rectal pain. visit:    Rectal bleeding  Hemorrhoids, unspecified hemorrhoid type  1. See detailed hpi  -abd exam neg  -no visual blood  episode occurred 1 time-->warning signs discussed on when to seek ER, if symptoms continue. 2. guac neg, likely   3.  Hx of diverticu

## 2018-02-14 NOTE — PATIENT INSTRUCTIONS
Thank you for choosing XANDER Diaz at [place] [address]  To Do: Maya Mcfadden  1.  Watch for anymore rectal bleeding and if continues needs to seek ER  -increase fluids  -mild diet for the next couple of days (no greasy, spicy foods)   -if is your duty and for your safety to discuss with the pharmacist and our office with questions, and to notify us and stop treatment if problems arise, but know that our intention is that the benefits outweigh those potential risks and we strive to make you

## 2018-02-14 NOTE — TELEPHONE ENCOUNTER
C/o abdominal pain that started 1.5 hours ago. Rates as a 6. When she urinated there was blood in the toilet bowl - per spouse he believes it was rectal.  No further bleeding.   Advised we should see her - he will bring now for appointment      Future Ubaldo

## 2018-03-07 ENCOUNTER — LAB ENCOUNTER (OUTPATIENT)
Dept: LAB | Age: 78
End: 2018-03-07
Attending: FAMILY MEDICINE
Payer: MEDICARE

## 2018-03-07 ENCOUNTER — PRIOR ORIGINAL RECORDS (OUTPATIENT)
Dept: OTHER | Age: 78
End: 2018-03-07

## 2018-03-07 ENCOUNTER — OFFICE VISIT (OUTPATIENT)
Dept: FAMILY MEDICINE CLINIC | Facility: CLINIC | Age: 78
End: 2018-03-07

## 2018-03-07 VITALS
DIASTOLIC BLOOD PRESSURE: 70 MMHG | BODY MASS INDEX: 36.46 KG/M2 | TEMPERATURE: 98 F | HEART RATE: 80 BPM | RESPIRATION RATE: 16 BRPM | HEIGHT: 57 IN | SYSTOLIC BLOOD PRESSURE: 130 MMHG | WEIGHT: 169 LBS

## 2018-03-07 DIAGNOSIS — Z00.00 LABORATORY EXAM ORDERED AS PART OF ROUTINE GENERAL MEDICAL EXAMINATION: ICD-10-CM

## 2018-03-07 DIAGNOSIS — M17.0 PRIMARY OSTEOARTHRITIS OF BOTH KNEES: ICD-10-CM

## 2018-03-07 DIAGNOSIS — Z76.89 ENCOUNTER TO ESTABLISH CARE WITH NEW DOCTOR: Primary | ICD-10-CM

## 2018-03-07 DIAGNOSIS — Z28.21 INFLUENZA VACCINATION DECLINED BY PATIENT: ICD-10-CM

## 2018-03-07 DIAGNOSIS — I51.89 DIASTOLIC DYSFUNCTION: ICD-10-CM

## 2018-03-07 DIAGNOSIS — F41.9 ANXIETY: ICD-10-CM

## 2018-03-07 DIAGNOSIS — I27.20 PULMONARY HTN (HCC): ICD-10-CM

## 2018-03-07 PROBLEM — R60.0 BILATERAL LOWER EXTREMITY EDEMA: Status: ACTIVE | Noted: 2018-03-07

## 2018-03-07 PROBLEM — E66.09 CLASS 2 OBESITY DUE TO EXCESS CALORIES WITHOUT SERIOUS COMORBIDITY WITH BODY MASS INDEX (BMI) OF 36.0 TO 36.9 IN ADULT: Status: ACTIVE | Noted: 2018-03-07

## 2018-03-07 PROBLEM — R06.83 SNORING: Status: ACTIVE | Noted: 2018-03-07

## 2018-03-07 PROBLEM — G31.84 MILD COGNITIVE IMPAIRMENT WITH MEMORY LOSS: Status: ACTIVE | Noted: 2018-03-07

## 2018-03-07 LAB
ALBUMIN SERPL-MCNC: 3.9 G/DL (ref 3.5–4.8)
ALP LIVER SERPL-CCNC: 86 U/L (ref 55–142)
ALT SERPL-CCNC: 22 U/L (ref 14–54)
AST SERPL-CCNC: 21 U/L (ref 15–41)
BASOPHILS # BLD AUTO: 0.04 X10(3) UL (ref 0–0.1)
BASOPHILS NFR BLD AUTO: 0.6 %
BILIRUB SERPL-MCNC: 1.1 MG/DL (ref 0.1–2)
BUN BLD-MCNC: 28 MG/DL (ref 8–20)
CALCIUM BLD-MCNC: 9.3 MG/DL (ref 8.3–10.3)
CHLORIDE: 103 MMOL/L (ref 101–111)
CHOLEST SMN-MCNC: 204 MG/DL (ref ?–200)
CO2: 30 MMOL/L (ref 22–32)
CREAT BLD-MCNC: 0.76 MG/DL (ref 0.55–1.02)
EOSINOPHIL # BLD AUTO: 0.22 X10(3) UL (ref 0–0.3)
EOSINOPHIL NFR BLD AUTO: 3.2 %
ERYTHROCYTE [DISTWIDTH] IN BLOOD BY AUTOMATED COUNT: 12.3 % (ref 11.5–16)
GLUCOSE BLD-MCNC: 100 MG/DL (ref 70–99)
HCT VFR BLD AUTO: 41.1 % (ref 34–50)
HDLC SERPL-MCNC: 55 MG/DL (ref 45–?)
HDLC SERPL: 3.71 {RATIO} (ref ?–4.44)
HGB BLD-MCNC: 13.3 G/DL (ref 12–16)
IMMATURE GRANULOCYTE COUNT: 0.02 X10(3) UL (ref 0–1)
IMMATURE GRANULOCYTE RATIO %: 0.3 %
LDLC SERPL CALC-MCNC: 108 MG/DL (ref ?–130)
LYMPHOCYTES # BLD AUTO: 1.7 X10(3) UL (ref 0.9–4)
LYMPHOCYTES NFR BLD AUTO: 24.7 %
M PROTEIN MFR SERPL ELPH: 7.8 G/DL (ref 6.1–8.3)
MCH RBC QN AUTO: 30.4 PG (ref 27–33.2)
MCHC RBC AUTO-ENTMCNC: 32.4 G/DL (ref 31–37)
MCV RBC AUTO: 94.1 FL (ref 81–100)
MONOCYTES # BLD AUTO: 0.7 X10(3) UL (ref 0.1–1)
MONOCYTES NFR BLD AUTO: 10.2 %
NEUTROPHIL ABS PRELIM: 4.2 X10 (3) UL (ref 1.3–6.7)
NEUTROPHILS # BLD AUTO: 4.2 X10(3) UL (ref 1.3–6.7)
NEUTROPHILS NFR BLD AUTO: 61 %
NONHDLC SERPL-MCNC: 149 MG/DL (ref ?–130)
PLATELET # BLD AUTO: 236 10(3)UL (ref 150–450)
POTASSIUM SERPL-SCNC: 3.9 MMOL/L (ref 3.6–5.1)
RBC # BLD AUTO: 4.37 X10(6)UL (ref 3.8–5.1)
RED CELL DISTRIBUTION WIDTH-SD: 42.6 FL (ref 35.1–46.3)
SODIUM SERPL-SCNC: 140 MMOL/L (ref 136–144)
TRIGL SERPL-MCNC: 203 MG/DL (ref ?–150)
VLDLC SERPL CALC-MCNC: 41 MG/DL (ref 5–40)
WBC # BLD AUTO: 6.9 X10(3) UL (ref 4–13)

## 2018-03-07 PROCEDURE — 85025 COMPLETE CBC W/AUTO DIFF WBC: CPT

## 2018-03-07 PROCEDURE — 99214 OFFICE O/P EST MOD 30 MIN: CPT | Performed by: FAMILY MEDICINE

## 2018-03-07 PROCEDURE — 80061 LIPID PANEL: CPT

## 2018-03-07 PROCEDURE — 80053 COMPREHEN METABOLIC PANEL: CPT

## 2018-03-07 PROCEDURE — 36415 COLL VENOUS BLD VENIPUNCTURE: CPT

## 2018-03-07 NOTE — PROGRESS NOTES
HPI:   Pablito Arcos is a 76year old female that presents for follow up and to establish care with new PCP. She has history of anxiety currently well-controlled on citalopram and Xanax as needed. She has history of obesity and prediabetes.   She ea 90 tablet, Rfl: 3  •  Donepezil HCl 10 MG Oral Tab, Take 10 mg by mouth nightly., Disp: , Rfl:   •  escitalopram 5 MG Oral Tab, Take 5 mg by mouth daily. , Disp: , Rfl:     REVIEW OF SYSTEMS:     Comprehensive ROS negative unless noted in HPI    PHYSICAL EX [facility]), Diastolic dysfunction  - Pulmonology recommend sleep study. Likely mitral regurgitation, diastolic dysfunction and deconditioning cause of her ELLIS  - she declines sleep study     4. Anxiety  - on lexapro daily and xanax prn, symptoms stable    5.  P

## 2018-03-12 DIAGNOSIS — E78.5 HYPERLIPIDEMIA, UNSPECIFIED HYPERLIPIDEMIA TYPE: ICD-10-CM

## 2018-03-12 DIAGNOSIS — Z51.81 THERAPEUTIC DRUG MONITORING: Primary | ICD-10-CM

## 2018-03-12 RX ORDER — ATORVASTATIN CALCIUM 10 MG/1
10 TABLET, FILM COATED ORAL NIGHTLY
Qty: 90 TABLET | Refills: 1 | Status: SHIPPED | OUTPATIENT
Start: 2018-03-12 | End: 2018-09-10

## 2018-03-12 NOTE — PROGRESS NOTES
Patient walked in to the office and explained below, pt veralized understanding and will  script and have labs drawn in 3 mos.

## 2018-03-12 NOTE — PROGRESS NOTES
Elevated cholesterol. Will start atorvastatin 10 mg daily and recheck CMP in 3 months and lipid panel in 6 months. Patient can call and stop medications if he has any abdominal pain or muscle aches. Prescription sent to pharmacy.     Iza Cox DO

## 2018-04-02 ENCOUNTER — TELEPHONE (OUTPATIENT)
Dept: FAMILY MEDICINE CLINIC | Facility: CLINIC | Age: 78
End: 2018-04-02

## 2018-04-02 RX ORDER — MELOXICAM 15 MG/1
15 TABLET ORAL DAILY
Qty: 90 TABLET | Refills: 1 | Status: SHIPPED | OUTPATIENT
Start: 2018-04-02 | End: 2018-07-11

## 2018-04-02 NOTE — TELEPHONE ENCOUNTER
Requesting meloxicam  LOV: 3/7/18  RTC: 6 months  Last Relevant Labs: n/a  Filled: 5/9/17 #90 with 3 refills script printed  Used for OA bilateral knees  Future Appointments  Date Time Provider Shayy Rajput   9/10/2018 8:00 AM Kip Mixon DO [facility]

## 2018-04-02 NOTE — TELEPHONE ENCOUNTER
Pt is in need of rx for     Meloxicam 15 MG Oral Tab 90 tablet 3 5/9/2017     Sig - Route: Take 1 tablet (15 mg total) by mouth daily. - Oral      Can this be filled today if possivble?     [address] [place] - [address] [place], [place] [address]all [facility],

## 2018-04-19 ENCOUNTER — TELEPHONE (OUTPATIENT)
Dept: FAMILY MEDICINE CLINIC | Facility: CLINIC | Age: 78
End: 2018-04-19

## 2018-04-19 RX ORDER — ALPRAZOLAM 0.5 MG/1
0.5 TABLET ORAL 3 TIMES DAILY PRN
Qty: 90 TABLET | Refills: 2 | Status: SHIPPED
Start: 2018-04-19 | End: 2018-08-06

## 2018-04-19 NOTE — TELEPHONE ENCOUNTER
Requesting Alprazolam   LOV: 3/7/18  RTC: 6 months   Last Relevant Labs: n/a   Filled: 11/6/17 #90 with 4 refills    Future Appointments  Date Time Provider Shayy Rajput   9/10/2018 8:00 AM Virginia Mixon,  [facility] [address] [facility] [address]       Per [place]  las call when he is available. Daughter verbalized understanding.

## 2018-04-19 NOTE — TELEPHONE ENCOUNTER
Daughter called back and said that she spoke with the father and patient was given a temporary refill by Neurologist prior to establishing with Dr. Troy Porter. Unfortunately the Neurologist wrote for once daily and not the TID as it was prescribed by Dr. Zita Neal.

## 2018-04-19 NOTE — TELEPHONE ENCOUNTER
Pt's  is requesting new rx for     ALPRAZolam 0.5 MG Oral Tab 90 tablet 4 11/6/2017     Sig - Route: Take 1 tablet (0.5 mg total) by mouth 3 (three) times daily as needed for Anxiety.  Take 1 tablet three times per day as needed for anxiety - Oral

## 2018-04-19 NOTE — TELEPHONE ENCOUNTER
Notes reviewed. Does appear patient has consistently been taking xanax TID from previous PCP. Will refill for 3 months and please remind patient of labs due in June. Appreciate the effort in sorting this out. Thanks.       Iza Cox, DO  [facility] Me

## 2018-05-17 ENCOUNTER — TELEPHONE (OUTPATIENT)
Dept: FAMILY MEDICINE CLINIC | Facility: CLINIC | Age: 78
End: 2018-05-17

## 2018-05-17 RX ORDER — PANTOPRAZOLE SODIUM 40 MG/1
TABLET, DELAYED RELEASE ORAL
Qty: 90 TABLET | Refills: 1 | Status: SHIPPED | OUTPATIENT
Start: 2018-05-17 | End: 2018-11-19

## 2018-05-17 NOTE — TELEPHONE ENCOUNTER
Requesting Protonix  LOV: 3/7/18  RTC: 6 months  Last Relevant Labs: n/a  Filled: 5/9/17 #90 with 3 refills    Future Appointments  Date Time Provider Shayy Rajput   9/10/2018 8:00 AM Britany Mixon,  [facility] [address] [facility] [address]       Non protocol med pen

## 2018-05-22 ENCOUNTER — HOSPITAL ENCOUNTER (OUTPATIENT)
Dept: MAMMOGRAPHY | Age: 78
Discharge: HOME | End: 2018-05-22
Attending: INTERNAL MEDICINE
Payer: MEDICARE

## 2018-05-22 DIAGNOSIS — Z12.31 ENCOUNTER FOR SCREENING MAMMOGRAM FOR MALIGNANT NEOPLASM OF BREAST: ICD-10-CM

## 2018-05-22 PROCEDURE — 77067 SCR MAMMO BI INCL CAD: CPT | Performed by: INTERNAL MEDICINE

## 2018-05-24 ENCOUNTER — HOSPITAL ENCOUNTER (OUTPATIENT)
Dept: MAMMOGRAPHY | Age: 78
Discharge: HOME | End: 2018-05-24
Attending: INTERNAL MEDICINE
Payer: MEDICARE

## 2018-05-24 DIAGNOSIS — R92.2 INCONCLUSIVE MAMMOGRAM: ICD-10-CM

## 2018-05-24 PROCEDURE — 77065 DX MAMMO INCL CAD UNI: CPT | Performed by: INTERNAL MEDICINE

## 2018-05-24 PROCEDURE — 77061 BREAST TOMOSYNTHESIS UNI: CPT | Performed by: INTERNAL MEDICINE

## 2018-06-04 ENCOUNTER — PRIOR ORIGINAL RECORDS (OUTPATIENT)
Dept: OTHER | Age: 78
End: 2018-06-04

## 2018-06-04 ENCOUNTER — MYAURORA ACCOUNT LINK (OUTPATIENT)
Dept: OTHER | Age: 78
End: 2018-06-04

## 2018-06-07 DIAGNOSIS — M79.89 SWELLING OF LEFT LOWER EXTREMITY: ICD-10-CM

## 2018-06-07 RX ORDER — FUROSEMIDE 20 MG/1
TABLET ORAL
Qty: 90 TABLET | Refills: 0 | Status: SHIPPED | OUTPATIENT
Start: 2018-06-07 | End: 2019-12-17

## 2018-06-07 NOTE — TELEPHONE ENCOUNTER
Hypertension Medications Protocol Passed6/7 8:22 AM   CMP or BMP in past 12 months    Last serum creatinine< 2.0    Appointment in past 6 or next 3 months     Requesting furosemide 20mg  LOV: 3/7/18  RTC: 6 months  Last Relevant Labs: 3/7/18  Filled: 9/7/1

## 2018-06-19 LAB
ALBUMIN: 3.9 G/DL
ALKALINE PHOSPHATATE(ALK PHOS): 86 IU/L
BILIRUBIN TOTAL: 1.1 MG/DL
BUN: 28 MG/DL
CALCIUM: 9.3 MG/DL
CHLORIDE: 103 MEQ/L
CHOLESTEROL, TOTAL: 204 MG/DL
CREATININE, SERUM: 0.76 MG/DL
GLUCOSE: 100 MG/DL
HDL CHOLESTEROL: 55 MG/DL
HEMATOCRIT: 41.1 %
HEMOGLOBIN: 13.3 G/DL
LDL CHOLESTEROL: 108 MG/DL
PLATELETS: 236 K/UL
POTASSIUM, SERUM: 3.9 MEQ/L
PROTEIN, TOTAL: 7.8 G/DL
RED BLOOD COUNT: 4.37 X 10-6/U
SGOT (AST): 21 IU/L
SGPT (ALT): 22 IU/L
SODIUM: 140 MEQ/L
TRIGLYCERIDES: 203 MG/DL
WHITE BLOOD COUNT: 6.9 X 10-3/U

## 2018-07-09 ENCOUNTER — TELEPHONE (OUTPATIENT)
Dept: FAMILY MEDICINE CLINIC | Facility: CLINIC | Age: 78
End: 2018-07-09

## 2018-07-09 RX ORDER — MELOXICAM 15 MG/1
TABLET ORAL
Qty: 90 TABLET | Refills: 0 | OUTPATIENT
Start: 2018-07-09

## 2018-07-09 NOTE — TELEPHONE ENCOUNTER
Spoke to pt and pt was very confused, was unable to understand anything. Advised pt to have  call us as we need to know which medication pt needs refilled.   Medication filled on 4/2/18 is Meloxicam.  Per pharmacy, med was filled 4/2/18 #90 and 5/21/

## 2018-07-09 NOTE — TELEPHONE ENCOUNTER
Patient brought in his wife's medication bottle. He states the medication is all gone, and that pharmacy . Looked into patient chart and medication was filled the first time on 4-2-18 for 90 days with one refill.  Explained to patient that the pharmacy shou

## 2018-07-09 NOTE — TELEPHONE ENCOUNTER
Meloxicam Oral Tablet 15 MG  Will file in chart as: MELOXICAM 15 MG Oral Tab  TAKE ONE TABLET BY MOUTH ONE TIME DAILY        Disp: 90 tablet (Pharmacy requested 90)   Refills: 0     Class: Normal Start: 7/6/2018   Originally ordered: 1 year ago by [employer]

## 2018-07-10 RX ORDER — MELOXICAM 15 MG/1
TABLET ORAL
Qty: 90 TABLET | Refills: 0 | OUTPATIENT
Start: 2018-07-10

## 2018-07-10 NOTE — TELEPHONE ENCOUNTER
Requesting Meloxicam   Last Relevant Labs: n/a   Filled: 4/2/18 #90 with 1 refills    Future Appointments  Date Time Provider Shayy Atiya   7/11/2018 12:45 PM Mikayla Mixon, DO [facility] [address] [facility] [address]   9/10/2018 8:00 AM Britany Mixon, DO EMG 20 EM

## 2018-07-10 NOTE — TELEPHONE ENCOUNTER
PT needs Meloxicam, pts  states that pt sometimes takes medication 2-3 times a day. Advised pts  that pt will need to be seen by Dr Beulah Carrasco to discuss her medications as pt is taking more than she being prescribed.   states that wife is

## 2018-07-11 ENCOUNTER — OFFICE VISIT (OUTPATIENT)
Dept: FAMILY MEDICINE CLINIC | Facility: CLINIC | Age: 78
End: 2018-07-11

## 2018-07-11 ENCOUNTER — LAB ENCOUNTER (OUTPATIENT)
Dept: LAB | Age: 78
End: 2018-07-11
Attending: FAMILY MEDICINE
Payer: MEDICARE

## 2018-07-11 VITALS
RESPIRATION RATE: 20 BRPM | SYSTOLIC BLOOD PRESSURE: 150 MMHG | WEIGHT: 175.38 LBS | HEART RATE: 80 BPM | TEMPERATURE: 98 F | HEIGHT: 57 IN | BODY MASS INDEX: 37.84 KG/M2 | DIASTOLIC BLOOD PRESSURE: 80 MMHG

## 2018-07-11 DIAGNOSIS — R23.8 EASY BRUISING: ICD-10-CM

## 2018-07-11 DIAGNOSIS — M17.0 PRIMARY OSTEOARTHRITIS OF BOTH KNEES: Primary | ICD-10-CM

## 2018-07-11 LAB
BASOPHILS # BLD AUTO: 0.05 X10(3) UL (ref 0–0.1)
BASOPHILS NFR BLD AUTO: 0.9 %
EOSINOPHIL # BLD AUTO: 0.13 X10(3) UL (ref 0–0.3)
EOSINOPHIL NFR BLD AUTO: 2.4 %
ERYTHROCYTE [DISTWIDTH] IN BLOOD BY AUTOMATED COUNT: 12.2 % (ref 11.5–16)
HCT VFR BLD AUTO: 39.4 % (ref 34–50)
HGB BLD-MCNC: 12.5 G/DL (ref 12–16)
IMMATURE GRANULOCYTE COUNT: 0.01 X10(3) UL (ref 0–1)
IMMATURE GRANULOCYTE RATIO %: 0.2 %
LYMPHOCYTES # BLD AUTO: 1.75 X10(3) UL (ref 0.9–4)
LYMPHOCYTES NFR BLD AUTO: 32.5 %
MCH RBC QN AUTO: 30.3 PG (ref 27–33.2)
MCHC RBC AUTO-ENTMCNC: 31.7 G/DL (ref 31–37)
MCV RBC AUTO: 95.6 FL (ref 81–100)
MONOCYTES # BLD AUTO: 0.59 X10(3) UL (ref 0.1–1)
MONOCYTES NFR BLD AUTO: 10.9 %
NEUTROPHIL ABS PRELIM: 2.86 X10 (3) UL (ref 1.3–6.7)
NEUTROPHILS # BLD AUTO: 2.86 X10(3) UL (ref 1.3–6.7)
NEUTROPHILS NFR BLD AUTO: 53.1 %
PLATELET # BLD AUTO: 212 10(3)UL (ref 150–450)
RBC # BLD AUTO: 4.12 X10(6)UL (ref 3.8–5.1)
RED CELL DISTRIBUTION WIDTH-SD: 42.5 FL (ref 35.1–46.3)
WBC # BLD AUTO: 5.4 X10(3) UL (ref 4–13)

## 2018-07-11 PROCEDURE — 99213 OFFICE O/P EST LOW 20 MIN: CPT | Performed by: FAMILY MEDICINE

## 2018-07-11 PROCEDURE — 36415 COLL VENOUS BLD VENIPUNCTURE: CPT

## 2018-07-11 PROCEDURE — 85025 COMPLETE CBC W/AUTO DIFF WBC: CPT

## 2018-07-11 RX ORDER — MELOXICAM 15 MG/1
15 TABLET ORAL 2 TIMES DAILY PRN
Qty: 90 TABLET | Refills: 1 | Status: SHIPPED | OUTPATIENT
Start: 2018-07-11 | End: 2018-12-10

## 2018-07-11 NOTE — PROGRESS NOTES
HPI:   Beulah Rojo is a 76year old female that presents for follow up arthritis. Patient has severe osteoarthritis in bilateral knees and back. She has had steroid injections in the past which did not help significantly.   She has been taking melox Donepezil HCl 10 MG Oral Tab, Take 10 mg by mouth nightly., Disp: , Rfl:   •  escitalopram 5 MG Oral Tab, Take 5 mg by mouth daily. , Disp: , Rfl:     REVIEW OF SYSTEMS:     Comprehensive ROS negative unless noted in HPI    PHYSICAL EXAM:   /80   Puls times daily as needed for Pain. Dispense: 90 tablet; Refill: 1    2. Easy bruising  - likely 2/2 meloxicam, bruise is overall healing and patient is asymptomatic, will get CBC and ctm closely  - CBC WITH DIFFERENTIAL WITH PLATELET;  Future      Risks, bene

## 2018-07-11 NOTE — PATIENT INSTRUCTIONS
Arthritis Pain  Meloxicam once per day as needed for arthritis pain. If pain continues, can add Tylenol every 4-6 hours as needed.     If pain still severe AFTER tylenol, then can take one additional pill of meloxicam.    Do not take more than 2 meloxica that looks like coffee grounds; red spots on the skin; unusual bruising or bleeding from the eye, gums, or nose  · signs and symptoms of liver injury like dark yellow or brown urine; general ill feeling or flu-like symptoms; light-colored stools; loss of a disorders  · cigarette smoker  · coronary artery bypass graft (CABG) surgery within the past 2 weeks  · drink more than 3 alcohol-containing drinks per day  · heart disease  · high blood pressure  · history of stomach bleeding  · kidney disease  · liver di

## 2018-08-07 RX ORDER — ALPRAZOLAM 0.5 MG/1
TABLET ORAL
Qty: 90 TABLET | Refills: 1 | Status: SHIPPED
Start: 2018-08-07 | End: 2018-09-25

## 2018-08-07 NOTE — TELEPHONE ENCOUNTER
Requesting Alprazolam 0.5mg   LOV: 7/11/18  RTC: 3 mos  Last Labs: n/a  Filled: 4/19/18 #90with 2 refills    Future Appointments  Date Time Provider Shayy Rajput   9/10/2018 8:00 AM Eric Mixon DO [facility] [address] [facility] [address]     Dispensed Written James Holley

## 2018-09-10 ENCOUNTER — LAB ENCOUNTER (OUTPATIENT)
Dept: LAB | Age: 78
End: 2018-09-10
Attending: FAMILY MEDICINE
Payer: MEDICARE

## 2018-09-10 ENCOUNTER — OFFICE VISIT (OUTPATIENT)
Dept: FAMILY MEDICINE CLINIC | Facility: CLINIC | Age: 78
End: 2018-09-10
Payer: MEDICARE

## 2018-09-10 VITALS
DIASTOLIC BLOOD PRESSURE: 64 MMHG | WEIGHT: 176 LBS | SYSTOLIC BLOOD PRESSURE: 120 MMHG | TEMPERATURE: 97 F | HEIGHT: 57 IN | BODY MASS INDEX: 37.97 KG/M2 | RESPIRATION RATE: 18 BRPM | HEART RATE: 72 BPM

## 2018-09-10 DIAGNOSIS — R73.03 PREDIABETES: ICD-10-CM

## 2018-09-10 DIAGNOSIS — Z00.00 ENCOUNTER FOR ANNUAL HEALTH EXAMINATION: Primary | ICD-10-CM

## 2018-09-10 DIAGNOSIS — E04.9 ENLARGED THYROID: ICD-10-CM

## 2018-09-10 DIAGNOSIS — Z23 NEED FOR VACCINATION WITH 13-POLYVALENT PNEUMOCOCCAL CONJUGATE VACCINE: ICD-10-CM

## 2018-09-10 DIAGNOSIS — Z78.0 POSTMENOPAUSAL: ICD-10-CM

## 2018-09-10 DIAGNOSIS — E78.5 HYPERLIPIDEMIA, UNSPECIFIED HYPERLIPIDEMIA TYPE: ICD-10-CM
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
## 2018-09-10 DIAGNOSIS — E78.2 MIXED HYPERLIPIDEMIA: ICD-10-CM

## 2018-09-10 DIAGNOSIS — M25.511 ACUTE PAIN OF RIGHT SHOULDER: ICD-10-CM

## 2018-09-10 DIAGNOSIS — R26.89 ABNORMALITY OF GAIT DUE TO IMPAIRMENT OF BALANCE: ICD-10-CM

## 2018-09-10 DIAGNOSIS — Z51.81 THERAPEUTIC DRUG MONITORING: ICD-10-CM

## 2018-09-10 DIAGNOSIS — Z00.00 ENCOUNTER FOR ANNUAL HEALTH EXAMINATION: ICD-10-CM

## 2018-09-10 DIAGNOSIS — E05.90 HYPERTHYROIDISM: ICD-10-CM

## 2018-09-10 DIAGNOSIS — M54.2 NECK PAIN: ICD-10-CM

## 2018-09-10 PROBLEM — E11.9 DM2 (DIABETES MELLITUS, TYPE 2) (HCC): Status: ACTIVE | Noted: 2018-09-10

## 2018-09-10 LAB
ALBUMIN SERPL-MCNC: 3.5 G/DL (ref 3.5–4.8)
ALBUMIN/GLOB SERPL: 0.9 {RATIO} (ref 1–2)
ALP LIVER SERPL-CCNC: 99 U/L (ref 55–142)
ALT SERPL-CCNC: 28 U/L (ref 14–54)
ANION GAP SERPL CALC-SCNC: 7 MMOL/L (ref 0–18)
AST SERPL-CCNC: 22 U/L (ref 15–41)
BILIRUB SERPL-MCNC: 1.1 MG/DL (ref 0.1–2)
BUN BLD-MCNC: 17 MG/DL (ref 8–20)
BUN/CREAT SERPL: 25 (ref 10–20)
CALCIUM BLD-MCNC: 9.2 MG/DL (ref 8.3–10.3)
CHLORIDE SERPL-SCNC: 106 MMOL/L (ref 101–111)
CHOLEST SMN-MCNC: 125 MG/DL (ref ?–200)
CO2 SERPL-SCNC: 29 MMOL/L (ref 22–32)
CREAT BLD-MCNC: 0.68 MG/DL (ref 0.55–1.02)
EST. AVERAGE GLUCOSE BLD GHB EST-MCNC: 143 MG/DL (ref 68–126)
GLOBULIN PLAS-MCNC: 3.7 G/DL (ref 2.5–4)
GLUCOSE BLD-MCNC: 165 MG/DL (ref 70–99)
HBA1C MFR BLD HPLC: 6.6 % (ref ?–5.7)
HDLC SERPL-MCNC: 45 MG/DL (ref 40–59)
LDLC SERPL CALC-MCNC: 43 MG/DL (ref ?–100)
M PROTEIN MFR SERPL ELPH: 7.2 G/DL (ref 6.1–8.3)
NONHDLC SERPL-MCNC: 80 MG/DL (ref ?–130)
OSMOLALITY SERPL CALC.SUM OF ELEC: 299 MOSM/KG (ref 275–295)
POTASSIUM SERPL-SCNC: 3.9 MMOL/L (ref 3.6–5.1)
SODIUM SERPL-SCNC: 142 MMOL/L (ref 136–144)
T4 FREE SERPL-MCNC: 1.3 NG/DL (ref 0.9–1.8)
TRIGL SERPL-MCNC: 184 MG/DL (ref 30–149)
TSI SER-ACNC: 0.09 MIU/ML (ref 0.35–5.5)
VLDLC SERPL CALC-MCNC: 37 MG/DL (ref 0–30)

## 2018-09-10 PROCEDURE — 83036 HEMOGLOBIN GLYCOSYLATED A1C: CPT

## 2018-09-10 PROCEDURE — G0439 PPPS, SUBSEQ VISIT: HCPCS | Performed by: FAMILY MEDICINE

## 2018-09-10 PROCEDURE — 90670 PCV13 VACCINE IM: CPT | Performed by: FAMILY MEDICINE

## 2018-09-10 PROCEDURE — 80053 COMPREHEN METABOLIC PANEL: CPT

## 2018-09-10 PROCEDURE — 84443 ASSAY THYROID STIM HORMONE: CPT

## 2018-09-10 PROCEDURE — G0009 ADMIN PNEUMOCOCCAL VACCINE: HCPCS | Performed by: FAMILY MEDICINE

## 2018-09-10 PROCEDURE — 99214 OFFICE O/P EST MOD 30 MIN: CPT | Performed by: FAMILY MEDICINE

## 2018-09-10 PROCEDURE — 80061 LIPID PANEL: CPT

## 2018-09-10 PROCEDURE — 36415 COLL VENOUS BLD VENIPUNCTURE: CPT

## 2018-09-10 PROCEDURE — 84439 ASSAY OF FREE THYROXINE: CPT

## 2018-09-10 RX ORDER — ACETAMINOPHEN 325 MG/1
325 TABLET ORAL EVERY 6 HOURS PRN
COMMUNITY

## 2018-09-10 RX ORDER — ATORVASTATIN CALCIUM 10 MG/1
10 TABLET, FILM COATED ORAL NIGHTLY
Qty: 90 TABLET | Refills: 1 | Status: SHIPPED | OUTPATIENT
Start: 2018-09-10 | End: 2019-03-28

## 2018-09-10 NOTE — PROGRESS NOTES
HPI:   Reyna Hernandes is a 76year old female who presents for a Medicare Subsequent Annual Wellness visit (Pt already had Initial Annual Wellness).     Her last annual assessment has been over 1 year: Annual Physical due on 09/07/2018         Fall/Ris some help  She has difficulties Managing Money/Bills based on screening of functional status. Managing money/bills: Cannot do without help  She has difficulties Shopping for Groceries based on screening of functional status.    Shop for groceries: Need so Emil Lindsay (Physician Assistant)  Arden Kan (NEUROSURGERY)    Patient Active Problem List:     Hiatal hernia     Anxiety     GERD (gastroesophageal reflux disease)     Rotator cuff tear arthropathy of right shoulder     Primary osteoarthrit Cspine negative for acute changes, +for spondylosis and degenerative changes. CXR negative. RT knee XR shows moderate to severe OA, no fracture. R shoulder XR + for [facility] joint degenerative changes, negative for fracture. XR pelvis negative.  XR forearm ne 2 (two) times daily as needed for Pain.    FUROSEMIDE 20 MG Oral Tab TAKE ONE TABLET BY MOUTH IN THE MORNING    Pantoprazole Sodium 40 MG Oral Tab EC TAKE ONE TABLET BY MOUTH EVERY MORNING BEFORE BREAKFAST   Donepezil HCl 10 MG Oral Tab Take 10 mg by mouth depression or anxiety  HEMATOLOGIC: denies hx of anemia  ENDOCRINE: denies thyroid history  ALL/ASTHMA: denies hx of allergy or asthma    EXAM:   /64   Pulse 72   Temp 97.4 °F (36.3 °C) (Temporal)   Resp 18   Ht 57\"   Wt 176 lb   BMI 38.09 kg/m²  Es strength, sensation and reflexes in UE and LE b/l       Vaccination History     Immunization History   Administered Date(s) Administered   • Kenalog Per 10mg Inj 10/05/2011, 07/02/2014, 07/20/2016   • Orthovisc, Intra-Articular 06/08/2016, 06/15/2016, 06/2 refer to Endo for management of new hyperthyroidism and thyroid US.    -     ENDOCRINOLOGY - INTERNAL    Postmenopausal  -     XR DEXA BONE DENSITOMETRY (CPT=77080);  Future         Diet assessment: fair     PLAN:  The patient indicates understanding of the found.    Glaucoma Screening      Ophthalmology Visit Annually: Diabetics, FHx Glaucoma, AA>50, > 65 No flowsheet data found. Bone Density Screening      Dexascan Every two years No results found for this or any previous visit.  No flowsheet data (ACE/ARB, digoxin diuretics, anticonvulsants.)    Potassium  Annually Potassium (mmol/L)   Date Value   09/10/2018 3.9   12/11/2012 4.4     POTASSIUM (mmol/L)   Date Value   06/12/2014 3.8    No flowsheet data found.     Creatinine  Annually Creatinine, Ser

## 2018-09-10 NOTE — PATIENT INSTRUCTIONS
Dolore  Tylenol 650 mg ogni 6 ore, come necessario per il dolore  Meloxicam 1 compressa (15 mg) ogni 12 ore, se necessario per il dolore  Tramadol 50 mg di notte per Standard Yell grave solo. Questo farmaco può farti stanco e di dipendenza.   Non combinare con if indicated for medical reasons Electrocardiogram date Routine EKG is not a screening covered service except at the [place] to Medicare Visit    Abdominal aortic aneurysm screening (once between ages 73-68) IPPE only No results found for this or any previ patient. Chlamydia  Annually if high risk No results found for: CHLAMYDIA No flowsheet data found.     Screening Mammogram      Mammogram    Recommend Annually to at least age 76, and as needed after 76 There are no preventive care reminders to display Advance Directives. It also has the State forms available on it's website for anyone to review and print using their home computer and printer. (the forms are also available in [address])  www. [email or web address]. org  This link also has information from the [facility]

## 2018-09-17 ENCOUNTER — TELEPHONE (OUTPATIENT)
Dept: FAMILY MEDICINE CLINIC | Facility: CLINIC | Age: 78
End: 2018-09-17

## 2018-09-17 NOTE — TELEPHONE ENCOUNTER
Patient's spouse informed of specific numbers of cholesterol and glucose/a1c. He would like copy mailed to his home. Done.

## 2018-09-17 NOTE — TELEPHONE ENCOUNTER
Pts  would like to discuss with a nurse details of recent labs done regarding cholesterol & her diabetes.

## 2018-09-19 ENCOUNTER — TELEPHONE (OUTPATIENT)
Dept: FAMILY MEDICINE CLINIC | Facility: CLINIC | Age: 78
End: 2018-09-19

## 2018-09-19 NOTE — TELEPHONE ENCOUNTER
Daughter is calling stating the neurologist is requesting for the pt to go through her PCP to get a cat scan done due to the increase in pts confusion. Daughter is hoping MD will put the order in w/o having to bring pt in for an OV.      Please advise Gr

## 2018-09-19 NOTE — TELEPHONE ENCOUNTER
Per patients daughter, Neurologist is requesting another CT scan post fall as she informed them that a concussion will not appear on an initial CT scan, daughter states that neurologist informed them that pts PCP would need to order this.     Spoke to Dr Stout Poag

## 2018-09-20 NOTE — TELEPHONE ENCOUNTER
Paperwork received from [facility] - given to MD to review and make recommendation for f/u or to order CT

## 2018-09-20 NOTE — TELEPHONE ENCOUNTER
Trevor Cruz Nurse Practitioner states that patient son called the office and reported the following on Sept 6th:  PT fell Aug 31st initial fall was seen in [address] and had a CT scan, which was negative for bleeding, than the son called again Sept 18th, repo

## 2018-09-20 NOTE — TELEPHONE ENCOUNTER
Daughter informed. She at first declined appointment, then asked what was available. She is going to talk to her dad to see if he can bring her today. I advised there is a 3pm or 3:30 pm open.

## 2018-09-20 NOTE — TELEPHONE ENCOUNTER
Per Dr Shant Eli patient can come in for an OV for evaluation of her symptoms or go to the ER to be assessed.

## 2018-09-20 NOTE — TELEPHONE ENCOUNTER
Paperwork reviewed. Patients son contacted her Neurologist regarding increased confusion. Neurologist is recommending repeat CT at [facility],  but for unclear reason they are asking PCP to order it and then send results back to them.   To me, it makes more sens

## 2018-09-25 ENCOUNTER — OFFICE VISIT (OUTPATIENT)
Dept: FAMILY MEDICINE CLINIC | Facility: CLINIC | Age: 78
End: 2018-09-25
Payer: MEDICARE

## 2018-09-25 ENCOUNTER — APPOINTMENT (OUTPATIENT)
Dept: LAB | Age: 78
End: 2018-09-25
Attending: FAMILY MEDICINE
Payer: MEDICARE

## 2018-09-25 ENCOUNTER — HOSPITAL ENCOUNTER (OUTPATIENT)
Dept: CT IMAGING | Age: 78
Discharge: HOME | End: 2018-09-25
Attending: FAMILY MEDICINE
Payer: MEDICARE

## 2018-09-25 ENCOUNTER — LAB ENCOUNTER (OUTPATIENT)
Dept: LAB | Age: 78
End: 2018-09-25
Attending: FAMILY MEDICINE
Payer: MEDICARE

## 2018-09-25 VITALS
WEIGHT: 178 LBS | OXYGEN SATURATION: 97 % | HEART RATE: 84 BPM | RESPIRATION RATE: 16 BRPM | SYSTOLIC BLOOD PRESSURE: 130 MMHG | HEIGHT: 57 IN | DIASTOLIC BLOOD PRESSURE: 72 MMHG | BODY MASS INDEX: 38.4 KG/M2 | TEMPERATURE: 98 F

## 2018-09-25 DIAGNOSIS — Z13.6 SCREENING FOR CARDIOVASCULAR CONDITION: ICD-10-CM

## 2018-09-25 DIAGNOSIS — E05.90 HYPERTHYROIDISM: ICD-10-CM

## 2018-09-25 DIAGNOSIS — R41.82 ACUTE ALTERATION IN MENTAL STATUS: ICD-10-CM

## 2018-09-25 DIAGNOSIS — M54.2 NECK PAIN: ICD-10-CM

## 2018-09-25 DIAGNOSIS — E04.9 ENLARGED THYROID: ICD-10-CM

## 2018-09-25 DIAGNOSIS — R41.82 ACUTE ALTERATION IN MENTAL STATUS: Primary | ICD-10-CM

## 2018-09-25 DIAGNOSIS — G31.84 MILD COGNITIVE IMPAIRMENT WITH MEMORY LOSS: ICD-10-CM

## 2018-09-25 LAB
ALBUMIN SERPL-MCNC: 3.8 G/DL (ref 3.5–4.8)
ALBUMIN/GLOB SERPL: 1 {RATIO} (ref 1–2)
ALP LIVER SERPL-CCNC: 110 U/L (ref 55–142)
ALT SERPL-CCNC: 24 U/L (ref 14–54)
ANION GAP SERPL CALC-SCNC: 7 MMOL/L (ref 0–18)
AST SERPL-CCNC: 27 U/L (ref 15–41)
BASOPHILS # BLD AUTO: 0.04 X10(3) UL (ref 0–0.1)
BASOPHILS NFR BLD AUTO: 0.5 %
BILIRUB SERPL-MCNC: 0.8 MG/DL (ref 0.1–2)
BUN BLD-MCNC: 10 MG/DL (ref 8–20)
BUN/CREAT SERPL: 15.2 (ref 10–20)
CALCIUM BLD-MCNC: 9 MG/DL (ref 8.3–10.3)
CHLORIDE SERPL-SCNC: 105 MMOL/L (ref 101–111)
CO2 SERPL-SCNC: 28 MMOL/L (ref 22–32)
CREAT BLD-MCNC: 0.66 MG/DL (ref 0.55–1.02)
EOSINOPHIL # BLD AUTO: 0.19 X10(3) UL (ref 0–0.3)
EOSINOPHIL NFR BLD AUTO: 2.6 %
ERYTHROCYTE [DISTWIDTH] IN BLOOD BY AUTOMATED COUNT: 12.4 % (ref 11.5–16)
GLOBULIN PLAS-MCNC: 3.8 G/DL (ref 2.5–4)
GLUCOSE BLD-MCNC: 88 MG/DL (ref 70–99)
HCT VFR BLD AUTO: 39.4 % (ref 34–50)
HGB BLD-MCNC: 12.7 G/DL (ref 12–16)
IMMATURE GRANULOCYTE COUNT: 0.02 X10(3) UL (ref 0–1)
IMMATURE GRANULOCYTE RATIO %: 0.3 %
LYMPHOCYTES # BLD AUTO: 1.9 X10(3) UL (ref 0.9–4)
LYMPHOCYTES NFR BLD AUTO: 25.6 %
M PROTEIN MFR SERPL ELPH: 7.6 G/DL (ref 6.1–8.3)
MCH RBC QN AUTO: 31 PG (ref 27–33.2)
MCHC RBC AUTO-ENTMCNC: 32.2 G/DL (ref 31–37)
MCV RBC AUTO: 96.1 FL (ref 81–100)
MONOCYTES # BLD AUTO: 0.57 X10(3) UL (ref 0.1–1)
MONOCYTES NFR BLD AUTO: 7.7 %
MULTISTIX LOT#: NORMAL NUMERIC
NEUTROPHIL ABS PRELIM: 4.71 X10 (3) UL (ref 1.3–6.7)
NEUTROPHILS # BLD AUTO: 4.71 X10(3) UL (ref 1.3–6.7)
NEUTROPHILS NFR BLD AUTO: 63.3 %
OSMOLALITY SERPL CALC.SUM OF ELEC: 288 MOSM/KG (ref 275–295)
PH, URINE: 6 (ref 4.5–8)
PLATELET # BLD AUTO: 217 10(3)UL (ref 150–450)
POTASSIUM SERPL-SCNC: 3.7 MMOL/L (ref 3.6–5.1)
RBC # BLD AUTO: 4.1 X10(6)UL (ref 3.8–5.1)
RED CELL DISTRIBUTION WIDTH-SD: 43.1 FL (ref 35.1–46.3)
SODIUM SERPL-SCNC: 140 MMOL/L (ref 136–144)
SPECIFIC GRAVITY: 1 (ref 1–1.03)
UROBILINOGEN,SEMI-QN: 0.2 MG/DL (ref 0–1.9)
WBC # BLD AUTO: 7.4 X10(3) UL (ref 4–13)

## 2018-09-25 PROCEDURE — 93005 ELECTROCARDIOGRAM TRACING: CPT

## 2018-09-25 PROCEDURE — 93010 ELECTROCARDIOGRAM REPORT: CPT | Performed by: INTERNAL MEDICINE

## 2018-09-25 PROCEDURE — 70450 CT HEAD/BRAIN W/O DYE: CPT | Performed by: FAMILY MEDICINE

## 2018-09-25 PROCEDURE — 87086 URINE CULTURE/COLONY COUNT: CPT | Performed by: FAMILY MEDICINE

## 2018-09-25 PROCEDURE — 99215 OFFICE O/P EST HI 40 MIN: CPT | Performed by: FAMILY MEDICINE

## 2018-09-25 PROCEDURE — 85025 COMPLETE CBC W/AUTO DIFF WBC: CPT

## 2018-09-25 PROCEDURE — 81003 URINALYSIS AUTO W/O SCOPE: CPT | Performed by: FAMILY MEDICINE

## 2018-09-25 PROCEDURE — 80053 COMPREHEN METABOLIC PANEL: CPT

## 2018-09-25 PROCEDURE — 36415 COLL VENOUS BLD VENIPUNCTURE: CPT

## 2018-09-25 RX ORDER — ALPRAZOLAM 0.5 MG/1
TABLET ORAL
Qty: 90 TABLET | Refills: 2 | Status: SHIPPED | OUTPATIENT
Start: 2018-09-25 | End: 2018-12-10

## 2018-09-25 NOTE — PATIENT INSTRUCTIONS
Confusion/Agression  Head CT today and then we will contact you and Neurologist with results  Labs and urine culture today    Thyroid  Abnormal labs and enlarged thyroid   Follow up with Endocrinology and schedule thyroid ultrasound    Neck Pain  Continue

## 2018-09-25 NOTE — PROGRESS NOTES
HPI:   Mary Lou Culver is a 76year old female that presents for a follow-up on her recent fall on 8/31, daughter Braden Hoffmann states her memory has gotten acutely worse since the fall. Hx of MCI on donepezil and follows with neurology.  Patient is now intermitt meaningfully at this moment. They have tramadol from the ED which they have not been using. Past medical, surgical, family and social history reviewed in detail with patient. Updated where appropriate.       Patient Active Problem List    Enlarged th daily., Disp: , Rfl:     REVIEW OF SYSTEMS:     Comprehensive ROS negative unless noted in HPI    PHYSICAL EXAM:   /72   Pulse 84   Temp 98.2 °F (36.8 °C) (Oral)   Resp 16   Ht 57\"   Wt 178 lb   SpO2 97%   BMI 38.52 kg/m²  Estimated body mass index negative  - CT BRAIN OR HEAD (35934); Future  - URINE CULTURE, ROUTINE; Future  - CBC WITH DIFFERENTIAL WITH PLATELET; Future  - COMP METABOLIC PANEL (14); Future  - EKG 12-LEAD;  Future  - URINALYSIS, AUTO, W/O SCOPE  - URINE CULTURE, ROUTINE    Enlarged t

## 2018-09-26 ENCOUNTER — HOSPITAL ENCOUNTER (OUTPATIENT)
Dept: ULTRASOUND IMAGING | Age: 78
Discharge: HOME | End: 2018-09-26
Attending: FAMILY MEDICINE
Payer: MEDICARE

## 2018-09-26 DIAGNOSIS — E04.9 ENLARGED THYROID: ICD-10-CM

## 2018-09-26 DIAGNOSIS — E05.90 HYPERTHYROIDISM: ICD-10-CM

## 2018-09-26 PROBLEM — E04.2 MULTINODULAR THYROID: Status: ACTIVE | Noted: 2018-09-26

## 2018-09-26 LAB
ATRIAL RATE: 77 BPM
P AXIS: 54 DEGREES
P-R INTERVAL: 184 MS
Q-T INTERVAL: 392 MS
QRS DURATION: 68 MS
QTC CALCULATION (BEZET): 443 MS
R AXIS: 7 DEGREES
T AXIS: 41 DEGREES
VENTRICULAR RATE: 77 BPM

## 2018-09-26 PROCEDURE — 76536 US EXAM OF HEAD AND NECK: CPT | Performed by: FAMILY MEDICINE

## 2018-09-28 ENCOUNTER — TELEPHONE (OUTPATIENT)
Dept: FAMILY MEDICINE CLINIC | Facility: CLINIC | Age: 78
End: 2018-09-28

## 2018-09-28 NOTE — TELEPHONE ENCOUNTER
Received release form from [facility] [address]. Prepared and faxed to scan stat.  Faxed and confirmed

## 2018-10-22 PROCEDURE — 36415 COLL VENOUS BLD VENIPUNCTURE: CPT | Performed by: INTERNAL MEDICINE

## 2018-10-22 PROCEDURE — 84445 ASSAY OF TSI GLOBULIN: CPT | Performed by: INTERNAL MEDICINE

## 2018-11-20 RX ORDER — PANTOPRAZOLE SODIUM 40 MG/1
TABLET, DELAYED RELEASE ORAL
Qty: 90 TABLET | Refills: 3 | Status: SHIPPED | OUTPATIENT
Start: 2018-11-20 | End: 2019-10-09

## 2018-11-20 NOTE — TELEPHONE ENCOUNTER
Requesting PANTOPRAZOLE SODIUM 40 MG   LOV: 9/25/18  RTC: none specified  Last Labs: 10/22/18  Filled: 5/17/18#90 with 1 refills    Future Appointments   Date Time Provider Shayy Rajput   11/26/2018  9:00 AM [facility] INJECTION ROOM Yudelka Arndt

## 2018-12-10 ENCOUNTER — APPOINTMENT (OUTPATIENT)
Dept: LAB | Age: 78
End: 2018-12-10
Attending: FAMILY MEDICINE
Payer: MEDICARE

## 2018-12-10 ENCOUNTER — OFFICE VISIT (OUTPATIENT)
Dept: FAMILY MEDICINE CLINIC | Facility: CLINIC | Age: 78
End: 2018-12-10
Payer: MEDICARE

## 2018-12-10 VITALS
WEIGHT: 170.25 LBS | HEIGHT: 57 IN | RESPIRATION RATE: 16 BRPM | DIASTOLIC BLOOD PRESSURE: 66 MMHG | SYSTOLIC BLOOD PRESSURE: 104 MMHG | HEART RATE: 72 BPM | TEMPERATURE: 98 F | BODY MASS INDEX: 36.73 KG/M2

## 2018-12-10 DIAGNOSIS — E05.90 HYPERTHYROIDISM: ICD-10-CM

## 2018-12-10 DIAGNOSIS — F41.9 ANXIETY: ICD-10-CM

## 2018-12-10 DIAGNOSIS — E11.9 TYPE 2 DIABETES MELLITUS WITHOUT COMPLICATION, WITHOUT LONG-TERM CURRENT USE OF INSULIN (HCC): ICD-10-CM

## 2018-12-10 DIAGNOSIS — Z28.21 INFLUENZA VACCINATION DECLINED BY PATIENT: ICD-10-CM

## 2018-12-10 DIAGNOSIS — M17.0 PRIMARY OSTEOARTHRITIS OF BOTH KNEES: Primary | ICD-10-CM

## 2018-12-10 PROCEDURE — 82570 ASSAY OF URINE CREATININE: CPT

## 2018-12-10 PROCEDURE — 82043 UR ALBUMIN QUANTITATIVE: CPT

## 2018-12-10 PROCEDURE — 99213 OFFICE O/P EST LOW 20 MIN: CPT | Performed by: FAMILY MEDICINE

## 2018-12-10 PROCEDURE — 83036 HEMOGLOBIN GLYCOSYLATED A1C: CPT | Performed by: FAMILY MEDICINE

## 2018-12-10 RX ORDER — ALPRAZOLAM 0.5 MG/1
TABLET ORAL
Qty: 90 TABLET | Refills: 5 | Status: SHIPPED | OUTPATIENT
Start: 2018-12-10 | End: 2019-06-10

## 2018-12-10 RX ORDER — TRAMADOL HYDROCHLORIDE 50 MG/1
TABLET ORAL
Refills: 0 | COMMUNITY
Start: 2018-08-31 | End: 2018-12-10

## 2018-12-10 RX ORDER — MELOXICAM 15 MG/1
15 TABLET ORAL 2 TIMES DAILY PRN
Qty: 90 TABLET | Refills: 1 | Status: SHIPPED | OUTPATIENT
Start: 2018-12-10 | End: 2019-07-03

## 2018-12-10 NOTE — PROGRESS NOTES
HPI:   Beto Harrison is a [de-identified]year old female that presents for follow up. Hx of osteoarthritis of both knees, chronic. Stable on meloxicam and tylenol. She has steroid injections in knees previously x 3 without significant relief.   She tries to tablet, Rfl: 1  •  Pantoprazole Sodium 40 MG Oral Tab EC, take 1 tablet by mouth every morning before breakfast, Disp: 90 tablet, Rfl: 3  •  acetaminophen (TYLENOL) 325 MG Oral Tab, Take 325 mg by mouth every 6 (six) hours as needed for Pain., Disp: , Rfl: 1. Primary osteoarthritis of both knees  - Meloxicam 15 MG Oral Tab; Take 1 tablet (15 mg total) by mouth 2 (two) times daily as needed for Pain. Dispense: 90 tablet;  Refill: 1  - continue to work on healthy diet and staying active as much as possible

## 2018-12-10 NOTE — PATIENT INSTRUCTIONS
Please schedule Thyroid Scan and Bone Scan to check for osteoporosis. Call to schedule [phone]    Si prega di pianificare la scansione ozzy tiroide e la scansione ossea per verificare l'osteoporosi.   [phone]

## 2019-02-13 PROBLEM — M17.11 PRIMARY OSTEOARTHRITIS OF RIGHT KNEE: Status: ACTIVE | Noted: 2019-02-13

## 2019-02-13 PROBLEM — M17.12 PRIMARY OSTEOARTHRITIS OF LEFT KNEE: Status: ACTIVE | Noted: 2019-02-13

## 2019-02-28 VITALS
HEIGHT: 60 IN | HEART RATE: 74 BPM | WEIGHT: 168 LBS | SYSTOLIC BLOOD PRESSURE: 134 MMHG | DIASTOLIC BLOOD PRESSURE: 70 MMHG | BODY MASS INDEX: 32.98 KG/M2

## 2019-02-28 VITALS
HEIGHT: 60 IN | BODY MASS INDEX: 34.36 KG/M2 | SYSTOLIC BLOOD PRESSURE: 146 MMHG | DIASTOLIC BLOOD PRESSURE: 78 MMHG | HEART RATE: 64 BPM | WEIGHT: 175 LBS

## 2019-03-18 ENCOUNTER — TELEPHONE (OUTPATIENT)
Dept: FAMILY MEDICINE CLINIC | Facility: CLINIC | Age: 79
End: 2019-03-18

## 2019-03-18 ENCOUNTER — OFFICE VISIT (OUTPATIENT)
Dept: FAMILY MEDICINE CLINIC | Facility: CLINIC | Age: 79
End: 2019-03-18
Payer: MEDICARE

## 2019-03-18 VITALS
TEMPERATURE: 97 F | HEIGHT: 58 IN | RESPIRATION RATE: 16 BRPM | WEIGHT: 168 LBS | OXYGEN SATURATION: 99 % | DIASTOLIC BLOOD PRESSURE: 64 MMHG | SYSTOLIC BLOOD PRESSURE: 114 MMHG | HEART RATE: 72 BPM | BODY MASS INDEX: 35.26 KG/M2

## 2019-03-18 DIAGNOSIS — B37.2 CANDIDIASIS, INTERTRIGO: Primary | ICD-10-CM

## 2019-03-18 PROCEDURE — 99214 OFFICE O/P EST MOD 30 MIN: CPT | Performed by: FAMILY MEDICINE

## 2019-03-18 NOTE — TELEPHONE ENCOUNTER
Per - to call pharmacy and separate medication to Ketoconazole 2% and Hydrocortisone 2.5% with same directions. Pharmacy notified and prescription updated.  They will contact patient once its ready for ,    Called patients , Gloria Gomez

## 2019-03-18 NOTE — PATIENT INSTRUCTIONS
Candida Skin Infection (Adult)  Candida is type of yeast. It grows naturally on the skin and in the mouth. If it grows out of control, it can cause an infection.  Candida can cause infections in the genital area, skin folds, in the mouth, and under the rash is not gone after 14 days.   When to seek medical advice  Call your healthcare provider right away if any of these occur:  · Pain or redness that gets worse or spreads  · Fluid coming from the skin  · Yellow crusts on the skin  · Fever of 100.4°F (38°C using these medicines. Don’t use cornstarch powder. Cornstarch can cause the Candida infection to get worse. General care:  · Keep your skin clean by washing the area twice a day. · Use the cream as directed until your rash is gone.  Once the skin has hea

## 2019-03-18 NOTE — TELEPHONE ENCOUNTER
Patient states that the medication that was prescribed is too expensive it is $235. Please send different med to pharmacy and call patient's  to let him know.

## 2019-03-18 NOTE — PROGRESS NOTES
HPI:    Patient ID: Pablito Arcos is a [de-identified]year old female. Rash   This is a new problem. The current episode started in the past 7 days. The affected locations include the abdomen. The rash is characterized by redness, itchiness and peeling.  She was Cardiovascular: Normal rate, regular rhythm, normal heart sounds and intact distal pulses. Pulmonary/Chest: Effort normal and breath sounds normal.   Abdominal: Soft. Bowel sounds are normal. She exhibits no distension. There is no tenderness.  There is n

## 2019-03-28 ENCOUNTER — APPOINTMENT (OUTPATIENT)
Dept: LAB | Age: 79
End: 2019-03-28
Attending: FAMILY MEDICINE
Payer: MEDICARE

## 2019-03-28 ENCOUNTER — OFFICE VISIT (OUTPATIENT)
Dept: FAMILY MEDICINE CLINIC | Facility: CLINIC | Age: 79
End: 2019-03-28
Payer: MEDICARE

## 2019-03-28 VITALS
SYSTOLIC BLOOD PRESSURE: 110 MMHG | BODY MASS INDEX: 34.69 KG/M2 | DIASTOLIC BLOOD PRESSURE: 60 MMHG | HEIGHT: 58 IN | RESPIRATION RATE: 16 BRPM | HEART RATE: 88 BPM | TEMPERATURE: 98 F | WEIGHT: 165.25 LBS

## 2019-03-28 DIAGNOSIS — E11.9 TYPE 2 DIABETES MELLITUS WITHOUT COMPLICATION, WITHOUT LONG-TERM CURRENT USE OF INSULIN (HCC): ICD-10-CM

## 2019-03-28 DIAGNOSIS — Z13.89 SCREENING FOR DIABETIC PERIPHERAL NEUROPATHY: ICD-10-CM

## 2019-03-28 DIAGNOSIS — E78.2 MIXED HYPERLIPIDEMIA: ICD-10-CM

## 2019-03-28 DIAGNOSIS — L30.4 INTERTRIGO: ICD-10-CM

## 2019-03-28 DIAGNOSIS — E11.9 TYPE 2 DIABETES MELLITUS WITHOUT COMPLICATION, WITHOUT LONG-TERM CURRENT USE OF INSULIN (HCC): Primary | ICD-10-CM

## 2019-03-28 PROCEDURE — 80061 LIPID PANEL: CPT

## 2019-03-28 PROCEDURE — 83036 HEMOGLOBIN GLYCOSYLATED A1C: CPT

## 2019-03-28 PROCEDURE — 36415 COLL VENOUS BLD VENIPUNCTURE: CPT

## 2019-03-28 PROCEDURE — 80053 COMPREHEN METABOLIC PANEL: CPT

## 2019-03-28 PROCEDURE — 99214 OFFICE O/P EST MOD 30 MIN: CPT | Performed by: FAMILY MEDICINE

## 2019-03-28 RX ORDER — ATORVASTATIN CALCIUM 10 MG/1
10 TABLET, FILM COATED ORAL NIGHTLY
Qty: 90 TABLET | Refills: 1 | Status: SHIPPED | OUTPATIENT
Start: 2019-03-28 | End: 2019-10-09

## 2019-03-28 NOTE — PROGRESS NOTES
HPI:   Pema Cordero is a [de-identified]year old female that presents for a follow-up and chronic medical condition management. Intertrigo rash on her suprapubic area is much improved from one week ago  with hydrocortisone and ketoconazole.  She has been keep Kit, Apply once daily to cover the affected and immediate surrounding area for 2 weeks, Disp: 67.7 kit, Rfl: 2  •  ALPRAZolam 0.5 MG Oral Tab, TAKE ONE TABLET BY MOUTH THREE TIMES A DAY AS NEEDED FOR ANXIETY, Disp: 90 tablet, Rfl: 5  •  Meloxicam 15 MG Ora hepatosplenomegaly. EXTREMITIES:  No edema, no cyanosis, 2+ radial pulses b/l.  Bilateral barefoot skin diabetic exam is normal, visualized feet and the appearance is normal.  Bilateral monofilament/sensation of both feet is normal.  Pulsation pedal pulse

## 2019-03-29 ENCOUNTER — TELEPHONE (OUTPATIENT)
Dept: FAMILY MEDICINE CLINIC | Facility: CLINIC | Age: 79
End: 2019-03-29

## 2019-03-29 NOTE — TELEPHONE ENCOUNTER
Spoke to pt's daughter. She was informed of pt's lab results and recommendations to follow up in 6 months. She verbalized understanding results and recommendations. Regarding eye exam, she states pt had dm eye exam over 1 year ago with Dr. Lázaro Vang.  She did

## 2019-05-01 ENCOUNTER — MED REC SCAN ONLY (OUTPATIENT)
Dept: FAMILY MEDICINE CLINIC | Facility: CLINIC | Age: 79
End: 2019-05-01

## 2019-06-10 DIAGNOSIS — F41.9 ANXIETY: ICD-10-CM

## 2019-06-10 RX ORDER — ALPRAZOLAM 0.5 MG/1
TABLET ORAL
Qty: 90 TABLET | Refills: 5 | Status: SHIPPED | OUTPATIENT
Start: 2019-06-10 | End: 2019-10-09

## 2019-06-10 NOTE — TELEPHONE ENCOUNTER
Requesting Alprazolam 0.5mg three times daily as needed  LOV: 3/28/19  RTC: 6 months  Last Relevant Labs: n/a  Filled: 12/10/18 #90 with 5 refills    No future appointments.     Pt last seen on 12/10/18 for anxiety, advised to follow up in 3 months for [facility]

## 2019-06-11 NOTE — TELEPHONE ENCOUNTER
Faxed RX for Alprazolam approved per Dr. Basim Jason to [facility] [phone] with confirmation received. Task completed.

## 2019-06-28 ENCOUNTER — TELEPHONE (OUTPATIENT)
Dept: FAMILY MEDICINE CLINIC | Facility: CLINIC | Age: 79
End: 2019-06-28

## 2019-06-28 DIAGNOSIS — R30.0 DYSURIA: Primary | ICD-10-CM

## 2019-06-28 RX ORDER — NITROFURANTOIN 25; 75 MG/1; MG/1
100 CAPSULE ORAL 2 TIMES DAILY
Qty: 14 CAPSULE | Refills: 0 | Status: SHIPPED | OUTPATIENT
Start: 2019-06-28 | End: 2019-07-05

## 2019-06-28 NOTE — TELEPHONE ENCOUNTER
Daughter scheduled appt for Monday and prefers to bring patient in to be evaluated. Advised daughter that if symptoms worsen she is to take patient to the ER. Daughter verbalized understanding.

## 2019-06-28 NOTE — PROGRESS NOTES
Rx sent for Macrobid for presumptive UTI based on symptoms, patient's daughter was contacted by RN that she should be taken to the urgent care for evaluation. Daughter refused and instead made appt for Monday but that is too late. She has hx of UTIs.  Scrip

## 2019-06-28 NOTE — TELEPHONE ENCOUNTER
Christa Morales  Child - May release ROC (Narayanmosowmya). Spoke to daughter and informed that medication, was sent to the pharmacy. Daughter will follow up in one week to have pt see Dr Kelsie Marley regarding re-occurant UTI's.        Medication

## 2019-06-28 NOTE — TELEPHONE ENCOUNTER
Daughter is calling bc pt may possible have a UTI. Daughter is asking they can have a urine test ordered.

## 2019-06-28 NOTE — TELEPHONE ENCOUNTER
Symptoms reported: UTI  -burning sensation when urinating  -minor amount blood in urine  -No fevers   Some chills  -PT has alzheimer  -Daughter is requesting urine test to be done today. She is aware we close at 3 pm. States she can be here in 15 mins  Did

## 2019-07-03 DIAGNOSIS — M17.0 PRIMARY OSTEOARTHRITIS OF BOTH KNEES: ICD-10-CM

## 2019-07-05 RX ORDER — MELOXICAM 15 MG/1
15 TABLET ORAL 2 TIMES DAILY PRN
Qty: 90 TABLET | Refills: 0 | Status: SHIPPED | OUTPATIENT
Start: 2019-07-05 | End: 2019-10-09

## 2019-07-05 NOTE — TELEPHONE ENCOUNTER
Requested Prescriptions     Pending Prescriptions Disp Refills   • MELOXICAM 15 MG Oral Tab [Pharmacy Med Name: Meloxicam 15 Mg Tab Zydu] 90 tablet 0     Sig: Take 1 tablet (15 mg total) by mouth 2 (two) times daily as needed for Pain.        LOV: 3/28/2019

## 2019-10-09 ENCOUNTER — LAB ENCOUNTER (OUTPATIENT)
Dept: LAB | Age: 79
End: 2019-10-09
Attending: FAMILY MEDICINE
Payer: MEDICARE

## 2019-10-09 ENCOUNTER — OFFICE VISIT (OUTPATIENT)
Dept: FAMILY MEDICINE CLINIC | Facility: CLINIC | Age: 79
End: 2019-10-09
Payer: MEDICARE

## 2019-10-09 VITALS
HEIGHT: 58 IN | OXYGEN SATURATION: 98 % | SYSTOLIC BLOOD PRESSURE: 110 MMHG | BODY MASS INDEX: 33.82 KG/M2 | TEMPERATURE: 98 F | WEIGHT: 161.13 LBS | DIASTOLIC BLOOD PRESSURE: 60 MMHG | RESPIRATION RATE: 16 BRPM | HEART RATE: 74 BPM

## 2019-10-09 DIAGNOSIS — Z00.00 ENCOUNTER FOR ANNUAL HEALTH EXAMINATION: Primary | ICD-10-CM

## 2019-10-09 DIAGNOSIS — E78.2 MIXED HYPERLIPIDEMIA: ICD-10-CM

## 2019-10-09 DIAGNOSIS — E11.9 TYPE 2 DIABETES MELLITUS WITHOUT COMPLICATION, WITHOUT LONG-TERM CURRENT USE OF INSULIN (HCC): ICD-10-CM

## 2019-10-09 DIAGNOSIS — Z00.00 ENCOUNTER FOR ANNUAL HEALTH EXAMINATION: ICD-10-CM

## 2019-10-09 DIAGNOSIS — M17.0 PRIMARY OSTEOARTHRITIS OF BOTH KNEES: ICD-10-CM

## 2019-10-09 DIAGNOSIS — Z23 NEED FOR VACCINATION: ICD-10-CM

## 2019-10-09 DIAGNOSIS — Z13.31 NEGATIVE DEPRESSION SCREENING: ICD-10-CM

## 2019-10-09 DIAGNOSIS — Z78.0 POSTMENOPAUSAL: ICD-10-CM

## 2019-10-09 DIAGNOSIS — F41.9 ANXIETY: ICD-10-CM

## 2019-10-09 DIAGNOSIS — E05.90 HYPERTHYROIDISM: ICD-10-CM

## 2019-10-09 PROCEDURE — 84439 ASSAY OF FREE THYROXINE: CPT

## 2019-10-09 PROCEDURE — G0439 PPPS, SUBSEQ VISIT: HCPCS | Performed by: FAMILY MEDICINE

## 2019-10-09 PROCEDURE — 82570 ASSAY OF URINE CREATININE: CPT

## 2019-10-09 PROCEDURE — G0008 ADMIN INFLUENZA VIRUS VAC: HCPCS | Performed by: FAMILY MEDICINE

## 2019-10-09 PROCEDURE — 85025 COMPLETE CBC W/AUTO DIFF WBC: CPT

## 2019-10-09 PROCEDURE — 36415 COLL VENOUS BLD VENIPUNCTURE: CPT

## 2019-10-09 PROCEDURE — 83036 HEMOGLOBIN GLYCOSYLATED A1C: CPT

## 2019-10-09 PROCEDURE — 80061 LIPID PANEL: CPT

## 2019-10-09 PROCEDURE — G0009 ADMIN PNEUMOCOCCAL VACCINE: HCPCS | Performed by: FAMILY MEDICINE

## 2019-10-09 PROCEDURE — 80053 COMPREHEN METABOLIC PANEL: CPT

## 2019-10-09 PROCEDURE — 82043 UR ALBUMIN QUANTITATIVE: CPT

## 2019-10-09 PROCEDURE — 90662 IIV NO PRSV INCREASED AG IM: CPT | Performed by: FAMILY MEDICINE

## 2019-10-09 PROCEDURE — 84481 FREE ASSAY (FT-3): CPT

## 2019-10-09 PROCEDURE — 90732 PPSV23 VACC 2 YRS+ SUBQ/IM: CPT | Performed by: FAMILY MEDICINE

## 2019-10-09 PROCEDURE — 84443 ASSAY THYROID STIM HORMONE: CPT

## 2019-10-09 RX ORDER — PANTOPRAZOLE SODIUM 40 MG/1
TABLET, DELAYED RELEASE ORAL
Qty: 90 TABLET | Refills: 3 | Status: SHIPPED | OUTPATIENT
Start: 2019-10-09 | End: 2020-10-02

## 2019-10-09 RX ORDER — MELOXICAM 15 MG/1
15 TABLET ORAL 2 TIMES DAILY PRN
Qty: 90 TABLET | Refills: 3 | Status: SHIPPED | OUTPATIENT
Start: 2019-10-09 | End: 2020-10-02

## 2019-10-09 RX ORDER — ATORVASTATIN CALCIUM 10 MG/1
10 TABLET, FILM COATED ORAL NIGHTLY
Qty: 90 TABLET | Refills: 3 | Status: SHIPPED | OUTPATIENT
Start: 2019-10-09 | End: 2019-12-17

## 2019-10-09 RX ORDER — ALPRAZOLAM 0.5 MG/1
TABLET ORAL
Qty: 90 TABLET | Refills: 5 | Status: SHIPPED | OUTPATIENT
Start: 2019-10-09 | End: 2020-05-06

## 2019-10-09 NOTE — PROGRESS NOTES
HPI:   Francy Chavez is a 78year old female who presents for a Medicare Subsequent Annual Wellness visit (Pt already had Initial Annual Wellness). She is due for flu and PNA vaccine today. Due for labs. Due for bone scan.     Per pt she got her Difficulty walking?: Yes   She has problems with Daily Activities based on screening of functional status.    Problems with daily activities? : Yes          Depression Screening (PHQ-2/PHQ-9): Over the LAST 2 WEEKS   Little interest or pleasure in doing t Hyperlipidemia     Tinnitus     Enlarged thyroid     Hyperthyroidism     DM2 (diabetes mellitus, type 2) (HCC)     Multinodular thyroid     Primary osteoarthritis of left knee     Primary osteoarthritis of right knee    Wt Readings from Last 3 Encounters: mass index (BMI) of 36.0 to 36.9 in adult (1/3/5391), Diastolic dysfunction (3/6/7717), Diverticulosis (3/26/2012), GERD (gastroesophageal reflux disease), Hiatal hernia (3/26/2012), Hypertension, Mild cognitive impairment with memory loss (3/7/2018), Prim cooperative, no distress, appears stated age   Head:  Normocephalic, without obvious abnormality, atraumatic   Eyes:  PERRL, conjunctiva/corneas clear, EOM's intact both eyes   Ears:  Normal TM's and external ear canals, both ears   Nose: Nares normal, sep Solomon Jackson is a 78year old female who presents for a Medicare Assessment. PLAN SUMMARY:   Diagnoses and all orders for this visit:    Encounter for annual health examination  -     LIPID PANEL; Future  -     COMP METABOLIC PANEL (14);  Future Health     In the past six months, have you lost more than 10 pounds without trying?: 2 - No  Has your appetite been poor?: No  How does the patient maintain a good energy level?: Other  How would you describe your daily physical activity?: Light  How woul patient. Update Health Maintenance if applicable    Chlamydia  Annually if high risk No results found for: CHLAMYDIA No flowsheet data found.     Screening Mammogram      Mammogram Annually to 76, then as discussed There are no preventive care reminders to No flowsheet data found. Diabetes      HgbA1C  Annually HEMOGLOBIN A1C (%)   Date Value   12/10/2018 6.2 (A)     HgbA1C (%)   Date Value   03/28/2019 5.9 (H)       No flowsheet data found.     Creat/alb ratio  Annually Malb/Cre Calc (ug/mg)   Date Leda

## 2019-10-09 NOTE — PATIENT INSTRUCTIONS
Schedule DEXA bone scan to check for osteoporosis. Zuly Mcfadden's SCREENING SCHEDULE   Tests on this list are recommended by your physician but may not be covered, or covered at this frequency, by your insurer.  Please check with your insuran visit.  Limited to patients who meet one of the following criteria:   • Men who are 73-68 years old and have smoked more than 100 cigarettes in their lifetime   • Anyone with a family history    Colorectal Cancer Screening  Covered up to Age 76     Colonosc this patient.  Please get this Mammogram regularly   Immunizations      Influenza  Covered Annually Orders placed or performed in visit on 01/31/17   • INFLUENZA VIRUS VACCINE, QUAD, PRESERVATIVE FREE, 0.5 ML    Please get every year    Pneumococcal 13 (Pre also has information from the [address] [place] [address] regarding Advance Directives.

## 2019-10-14 ENCOUNTER — TELEPHONE (OUTPATIENT)
Dept: FAMILY MEDICINE CLINIC | Facility: CLINIC | Age: 79
End: 2019-10-14

## 2019-10-14 NOTE — PROGRESS NOTES
Received DM eye exam dated 5/2/19 from Dr. Lou Angela. Shows macular degeneration, no retinopathy per Dr. Carito Pena. Report has been abstracted.

## 2019-10-22 ENCOUNTER — OFFICE VISIT (OUTPATIENT)
Dept: FAMILY MEDICINE CLINIC | Facility: CLINIC | Age: 79
End: 2019-10-22
Payer: MEDICARE

## 2019-10-22 ENCOUNTER — TELEPHONE (OUTPATIENT)
Dept: FAMILY MEDICINE CLINIC | Facility: CLINIC | Age: 79
End: 2019-10-22

## 2019-10-22 VITALS
HEIGHT: 58 IN | BODY MASS INDEX: 33.58 KG/M2 | TEMPERATURE: 97 F | DIASTOLIC BLOOD PRESSURE: 66 MMHG | RESPIRATION RATE: 16 BRPM | OXYGEN SATURATION: 93 % | HEART RATE: 74 BPM | WEIGHT: 160 LBS | SYSTOLIC BLOOD PRESSURE: 124 MMHG

## 2019-10-22 DIAGNOSIS — L30.9 DERMATITIS: Primary | ICD-10-CM

## 2019-10-22 PROCEDURE — 99213 OFFICE O/P EST LOW 20 MIN: CPT | Performed by: PHYSICIAN ASSISTANT

## 2019-10-22 RX ORDER — CLOTRIMAZOLE AND BETAMETHASONE DIPROPIONATE 10; .64 MG/G; MG/G
1 CREAM TOPICAL 2 TIMES DAILY PRN
Qty: 60 G | Refills: 0 | Status: SHIPPED | OUTPATIENT
Start: 2019-10-22

## 2019-10-22 NOTE — TELEPHONE ENCOUNTER
Nawaf Maza states she has picked up the clotrimazole-betamethasone cream, Mupriocin ointment not available until after 3pm tomorrow. Nawaf Maza would like instructions on how and when to apply each medication.   She states last time she was instructed to apply th

## 2019-10-22 NOTE — PATIENT INSTRUCTIONS
Thank you for choosing Mihai Garcia PA-C at [place] [address]  To Do: Maya Mcfadden  1. Begin medications as prescribed  2. Keep area clean and dry  3.   Follow-up in 2 weeks, sooner if problems    • Please signup for MY CHART, which is electr company approved your testing, please call Central Scheduling at [phone]  Please allow our office 5 business days to contact you regarding any testing results.     Refill policies:   Allow 3 business days for refills; controlled substances may take dave

## 2019-10-22 NOTE — TELEPHONE ENCOUNTER
Dr.Broton- Felton's Daughter is calling to ask a few questions on application of creams given at todays appt.     Please call [phone]

## 2019-10-23 NOTE — TELEPHONE ENCOUNTER
Per Rashad Gonzales: apply one application of either the cream or ointment, let dry, apply one application of the other. Once healed, discontinue both the cream and ointment applications and use baby power to help keep area dry.   Trudy Valle on Mirian's recomm

## 2019-10-23 NOTE — TELEPHONE ENCOUNTER
Vero Rhoades is callihg to follow up on questions for instructions on medications. Does Pt also need to apply foot powder and gauze over Stomach.       Please call Daughter [phone]

## 2019-10-23 NOTE — PROGRESS NOTES
[facility] Internal Medicine Progress Note    CC:  Patient presents with:  Rash: lower abdomen , where pants line is at      HPI:   HPI  Patient is an 40-year-old female here with her . She is complaining of a rash on her lower abdomen. and headaches. /66   Pulse 74   Temp 97.4 °F (36.3 °C) (Temporal)   Resp 16   Ht 58\"   Wt 160 lb (72.6 kg)   SpO2 93%   BMI 33.44 kg/m²  Body mass index is 33.44 kg/m².   Physical Exam   Constitutional: She is oriented to person, place, and t comorbidity with body mass index (BMI) of 36.0 to 36.9 in adult     Hypertension     Mild cognitive impairment with memory loss     Pulmonary HTN (HCC)     Diastolic dysfunction     Snoring     Bilateral lower extremity edema     Hyperlipidemia     Tinnitu

## 2019-11-21 ENCOUNTER — HOSPITAL ENCOUNTER (EMERGENCY)
Age: 79
Discharge: HOME | End: 2019-11-21
Attending: EMERGENCY MEDICINE
Payer: MEDICARE

## 2019-11-21 VITALS
DIASTOLIC BLOOD PRESSURE: 74 MMHG | TEMPERATURE: 97 F | RESPIRATION RATE: 18 BRPM | HEART RATE: 79 BPM | SYSTOLIC BLOOD PRESSURE: 149 MMHG | OXYGEN SATURATION: 95 %

## 2019-11-21 DIAGNOSIS — S60.454A FOREIGN BODY OF RIGHT RING FINGER: Primary | ICD-10-CM

## 2019-11-21 PROCEDURE — 99281 EMR DPT VST MAYX REQ PHY/QHP: CPT

## 2019-11-21 NOTE — ED PROVIDER NOTES
Patient Seen in: [facility] Emergency Department In [place]      History   Patient presents with:  Swelling Edema (cardiovascular, metabolic)    Stated Complaint: swelling right 4th digit - ring stuck    HPI    78-year-old female comes to the hospital com ED Triage Vitals [11/21/19 1010]   /74   Pulse 79   Resp 18   Temp 97 °F (36.1 °C)   Temp src Temporal   SpO2 95 %   O2 Device None (Room air)       Current:/74   Pulse 79   Temp 97 °F (36.1 °C) (Temporal)   Resp 18   SpO2 95%         Physi

## 2020-04-14 ENCOUNTER — VIRTUAL PHONE E/M (OUTPATIENT)
Dept: FAMILY MEDICINE CLINIC | Facility: CLINIC | Age: 80
End: 2020-04-14
Payer: MEDICARE

## 2020-04-14 DIAGNOSIS — R30.0 DYSURIA: Primary | ICD-10-CM

## 2020-04-14 PROCEDURE — 99441 PHONE E/M BY PHYS 5-10 MIN: CPT | Performed by: FAMILY MEDICINE

## 2020-04-14 RX ORDER — NITROFURANTOIN 25; 75 MG/1; MG/1
100 CAPSULE ORAL 2 TIMES DAILY
Qty: 14 CAPSULE | Refills: 0 | Status: SHIPPED | OUTPATIENT
Start: 2020-04-14 | End: 2020-04-21

## 2020-04-14 NOTE — PROGRESS NOTES
Virtual/Telephone Check-In    Jansenhaily Mcfadden verbally consents to a Virtual/Telephone Check-In service on 04/14/20. Patient understands and accepts financial responsibility for any deductible, co-insurance and/or co-pays associated with this service. clotrimazole-betamethasone 1-0.05 % External Cream, Apply 1 Application topically 2 (two) times daily as needed. , Disp: 60 g, Rfl: 0  •  mupirocin 2 % External Ointment, Apply 1 Application topically 3 (three) times daily. , Disp: 1 Tube, Rfl: 0  •  Meloxic or ED reviewed. Patient (or parent) agrees to plan.       Johanne Oneil DO  Family Medicine   4/14/2020  3:53 PM

## 2020-05-05 DIAGNOSIS — F41.9 ANXIETY: ICD-10-CM

## 2020-05-06 RX ORDER — ALPRAZOLAM 0.5 MG/1
TABLET ORAL
Qty: 90 TABLET | Refills: 0 | Status: SHIPPED | OUTPATIENT
Start: 2020-05-06 | End: 2020-06-02

## 2020-05-06 NOTE — TELEPHONE ENCOUNTER
Requested Prescriptions     Pending Prescriptions Disp Refills   • ALPRAZOLAM 0.5 MG Oral Tab [Pharmacy Med Name: Alprazolam 0.5 Mg Tab Acta] 90 tablet 0     Sig: TAKE ONE TABLET BY MOUTH THREE TIMES A DAY AS NEEDED FOR ANXIETY     LOV: Virtual Phone Visit

## 2020-06-02 DIAGNOSIS — E11.9 TYPE 2 DIABETES MELLITUS WITHOUT COMPLICATION, WITHOUT LONG-TERM CURRENT USE OF INSULIN (HCC): ICD-10-CM

## 2020-06-02 DIAGNOSIS — Z00.00 LABORATORY EXAM ORDERED AS PART OF ROUTINE GENERAL MEDICAL EXAMINATION: Primary | ICD-10-CM

## 2020-06-02 DIAGNOSIS — F41.9 ANXIETY: ICD-10-CM

## 2020-06-02 RX ORDER — ALPRAZOLAM 0.5 MG/1
TABLET ORAL
Qty: 90 TABLET | Refills: 0 | Status: SHIPPED | OUTPATIENT
Start: 2020-06-02 | End: 2020-07-02

## 2020-06-02 NOTE — TELEPHONE ENCOUNTER
Requesting Alprazolam 0.5mg  LOV: 4/14/2020 Telephone (Dysuria)  RTC: prn  Last Relevant Labs: annual labs done 10/9/19  Filled: 5/6/2020 #90 with 0 refills    No future appointments.     Per [place] , last dispensed 5/6/2020 #90 (30 day supply)    Rx pended and routed to MD for approval/denial

## 2020-06-30 DIAGNOSIS — F41.9 ANXIETY: ICD-10-CM

## 2020-07-01 NOTE — TELEPHONE ENCOUNTER
Requesting ALPRAZOLAM 0.5 MG   LOV: 4/14/20 (virtual)  RTC:   Last Relevant Labs: lab orders pending  Filled: 6/2/20 #90 with 0 refills    No future appointments.     ALPRAZOLAM 05/06/2020 05/06/2020  90 tablet  30 [facility]

## 2020-07-02 RX ORDER — ALPRAZOLAM 0.5 MG/1
TABLET ORAL
Qty: 90 TABLET | Refills: 2 | Status: SHIPPED | OUTPATIENT
Start: 2020-07-02 | End: 2020-10-02

## 2020-07-02 NOTE — TELEPHONE ENCOUNTER
Daughter calling back to followup on refill, stated patient is completely out of medication. Asking if doctor can refill soon due to not having any medication.

## 2020-07-02 NOTE — TELEPHONE ENCOUNTER
Dr. Abdullahi Mio Daughter to schedule AWV and was told she cannot set appt at this time as she needs to speak with her Father before scheduling.  Gayle Soto

## 2020-07-02 NOTE — TELEPHONE ENCOUNTER
Rx sent. Please have them schedule AWV in 3 mo and get labs a few days before.     Tina Gonzalez, DO  Family Medicine

## 2020-08-17 ENCOUNTER — PATIENT MESSAGE (OUTPATIENT)
Dept: FAMILY MEDICINE CLINIC | Facility: CLINIC | Age: 80
End: 2020-08-17

## 2020-08-17 ENCOUNTER — TELEPHONE (OUTPATIENT)
Dept: FAMILY MEDICINE CLINIC | Facility: CLINIC | Age: 80
End: 2020-08-17

## 2020-08-17 PROBLEM — G31.84 MILD COGNITIVE IMPAIRMENT WITH MEMORY LOSS: Status: RESOLVED | Noted: 2018-03-07 | Resolved: 2020-08-17

## 2020-08-17 PROBLEM — F02.81 LATE ONSET ALZHEIMER'S DISEASE WITH BEHAVIORAL DISTURBANCE (HCC): Status: ACTIVE | Noted: 2020-08-17

## 2020-08-17 PROBLEM — G30.1 LATE ONSET ALZHEIMER'S DISEASE WITH BEHAVIORAL DISTURBANCE (HCC): Status: ACTIVE | Noted: 2020-08-17

## 2020-08-17 PROBLEM — F02.818 LATE ONSET ALZHEIMER'S DISEASE WITH BEHAVIORAL DISTURBANCE (HCC): Status: ACTIVE | Noted: 2020-08-17

## 2020-08-17 NOTE — TELEPHONE ENCOUNTER
Future Appointments   Date Time Provider Shayy Rajput   8/17/2020  4:30 PM Ly Mixon, DO [facility] [address] [facility] [address]     Patient verbally consents to a virtual/telephone check-in service for the date and time noted above.  Patient understands and accept

## 2020-08-17 NOTE — PROGRESS NOTES
Video Visit    Griselda Stallion Confuorto verbally consents to a Video Visit service on 08/17/20. Patient understands and accepts financial responsibility for any deductible, co-insurance and/or co-pays associated with this service.     Duration of the service: 1 Anxiety      Hiatal hernia          Current Outpatient Medications:   •  QUEtiapine Fumarate 50 MG Oral Tab, Take 1 tablet (50 mg total) by mouth 2 (two) times daily as needed. , Disp: 180 tablet, Rfl: 1  •  escitalopram 10 MG Oral Tab, Take 1 tablet (10 mg Oral Tab; Take 1 tablet (50 mg total) by mouth 2 (two) times daily as needed. Dispense: 180 tablet; Refill: 1    3. Moderate depressive disorder  - escitalopram 10 MG Oral Tab; Take 1 tablet (10 mg total) by mouth daily. Dispense: 90 tablet;  Refill: 1

## 2020-08-17 NOTE — TELEPHONE ENCOUNTER
From: Hanna Mcfadden  To:  Kenneth Muñoz DO  Sent: 8/17/2020 10:48 AM CDT  Subject: Non-Urgent Medical Question    This message is being sent by Lauren Rahman on behalf of Alfred Lowry,    My mom has a video appointment this afternoon and

## 2020-09-30 DIAGNOSIS — F41.9 ANXIETY: ICD-10-CM

## 2020-10-01 NOTE — TELEPHONE ENCOUNTER
Requesting Alprazolam 0.5mg  LOV: 8/17/2020  RTC: 2 months  Last Relevant Labs: 10/9/19  Filled: 7/2/2020 #90 with 2 refills    No future appointments.     Rx pended and routed for approval/denial

## 2020-10-01 NOTE — TELEPHONE ENCOUNTER
Patient states she is out of her medication, tried to explain CS takes a little longer, wants refill, please advise.

## 2020-10-01 NOTE — TELEPHONE ENCOUNTER
Medication(s) to Refill:   Requested Prescriptions     Pending Prescriptions Disp Refills   • ALPRAZOLAM 0.5 MG Oral Tab [Pharmacy Med Name: Alprazolam 0.5 Mg Tab Acta] 90 tablet 0     Sig: TAKE 1 TABLET BY MOUTH 3 TIMES DAILY AS NEEDED FOR ANXIETY

## 2020-10-02 ENCOUNTER — TELEPHONE (OUTPATIENT)
Dept: FAMILY MEDICINE CLINIC | Facility: CLINIC | Age: 80
End: 2020-10-02

## 2020-10-02 DIAGNOSIS — M17.0 PRIMARY OSTEOARTHRITIS OF BOTH KNEES: ICD-10-CM

## 2020-10-02 DIAGNOSIS — F41.9 ANXIETY: ICD-10-CM

## 2020-10-02 RX ORDER — MELOXICAM 15 MG/1
TABLET ORAL
Qty: 180 TABLET | Refills: 1 | Status: SHIPPED | OUTPATIENT
Start: 2020-10-02

## 2020-10-02 RX ORDER — PANTOPRAZOLE SODIUM 40 MG/1
TABLET, DELAYED RELEASE ORAL
Qty: 90 TABLET | Refills: 1 | Status: SHIPPED | OUTPATIENT
Start: 2020-10-02

## 2020-10-02 RX ORDER — ALPRAZOLAM 0.5 MG/1
TABLET ORAL
Qty: 90 TABLET | Refills: 0 | Status: SHIPPED | OUTPATIENT
Start: 2020-10-02 | End: 2020-10-28

## 2020-10-02 RX ORDER — ALPRAZOLAM 0.5 MG/1
TABLET ORAL
Qty: 90 TABLET | Refills: 0 | OUTPATIENT
Start: 2020-10-02

## 2020-10-02 NOTE — TELEPHONE ENCOUNTER
Duplicate RX requesting.   RX has been sent today for #180 with 1 refill for Meloxicam.    RX pended for Pantoprazole.    -Ok per Dr. Anne Lozano to send refill on Pantoprazole

## 2020-10-02 NOTE — TELEPHONE ENCOUNTER
.  Medication(s) to Refill:   Requested Prescriptions     Pending Prescriptions Disp Refills   • MELOXICAM 15 MG Oral Tab [Pharmacy Med Name: Meloxicam 15 Mg Tab Zydu] 90 tablet 0     Sig: TAKE ONE TABLET BY MOUTH TWICE A DAY AS NEEDED FOR PAIN     Refused

## 2020-10-02 NOTE — TELEPHONE ENCOUNTER
Future Appointments   Date Time Provider [facility] [place]   10/15/2020 12:30 PM Britany Mixon DO [facility] [address] [facility] [address]   _____________________________________________________    Meloxicam 15 MG Oral Tab   Pantoprazole Sodium 40 MG Oral Tab EC    IMELDA JAUREGUI

## 2020-10-15 ENCOUNTER — OFFICE VISIT (OUTPATIENT)
Dept: FAMILY MEDICINE CLINIC | Facility: CLINIC | Age: 80
End: 2020-10-15
Payer: MEDICARE

## 2020-10-15 VITALS
HEART RATE: 80 BPM | SYSTOLIC BLOOD PRESSURE: 118 MMHG | WEIGHT: 170.5 LBS | HEIGHT: 58 IN | TEMPERATURE: 98 F | DIASTOLIC BLOOD PRESSURE: 60 MMHG | RESPIRATION RATE: 16 BRPM | BODY MASS INDEX: 35.79 KG/M2

## 2020-10-15 DIAGNOSIS — M17.12 PRIMARY OSTEOARTHRITIS OF LEFT KNEE: ICD-10-CM

## 2020-10-15 DIAGNOSIS — E05.90 HYPERTHYROIDISM: ICD-10-CM

## 2020-10-15 DIAGNOSIS — E11.9 TYPE 2 DIABETES MELLITUS WITHOUT COMPLICATION, WITHOUT LONG-TERM CURRENT USE OF INSULIN (HCC): ICD-10-CM

## 2020-10-15 DIAGNOSIS — G30.1 LATE ONSET ALZHEIMER'S DISEASE WITH BEHAVIORAL DISTURBANCE (HCC): ICD-10-CM

## 2020-10-15 DIAGNOSIS — Z00.00 ENCOUNTER FOR ANNUAL HEALTH EXAMINATION: Primary | ICD-10-CM

## 2020-10-15 DIAGNOSIS — F02.81 LATE ONSET ALZHEIMER'S DISEASE WITH BEHAVIORAL DISTURBANCE (HCC): ICD-10-CM

## 2020-10-15 DIAGNOSIS — Z23 NEED FOR VACCINATION: ICD-10-CM

## 2020-10-15 PROCEDURE — 90662 IIV NO PRSV INCREASED AG IM: CPT | Performed by: FAMILY MEDICINE

## 2020-10-15 PROCEDURE — G0008 ADMIN INFLUENZA VIRUS VAC: HCPCS | Performed by: FAMILY MEDICINE

## 2020-10-15 PROCEDURE — G0439 PPPS, SUBSEQ VISIT: HCPCS | Performed by: FAMILY MEDICINE

## 2020-10-15 RX ORDER — TRAMADOL HYDROCHLORIDE 50 MG/1
50 TABLET ORAL DAILY PRN
Qty: 30 TABLET | Refills: 1 | Status: SHIPPED | OUTPATIENT
Start: 2020-10-15

## 2020-10-15 RX ORDER — QUETIAPINE 100 MG/1
100 TABLET, FILM COATED ORAL 2 TIMES DAILY
Qty: 180 TABLET | Refills: 1 | Status: SHIPPED | OUTPATIENT
Start: 2020-10-15 | End: 2021-05-06

## 2020-10-15 NOTE — PATIENT INSTRUCTIONS
Alec Mcfadden's SCREENING SCHEDULE   Tests on this list are recommended by your physician but may not be covered, or covered at this frequency, by your insurer. Please check with your insurance carrier before scheduling to verify coverage.    Rossana Conde • Men who are 73-68 years old and have smoked more than 100 cigarettes in their lifetime   • Anyone with a family history    Colorectal Cancer Screening  Covered up to Age 76     Colonoscopy Screen   Covered every 10 years- more often if abnormal There a Mammogram regularly   Immunizations      Influenza  Covered Annually Orders placed or performed in visit on 10/09/19   • FLU VACC HIGH DOSE PRSV FREE   Orders placed or performed in visit on 01/31/17   • INFLUENZA VIRUS VACCINE, QUAD, PRESERVATIVE FREE, 0. printer. (the forms are also available in [address])  www. [email or web address] org  This link also has information from the [facility] regarding Advance Directives.

## 2020-10-15 NOTE — PROGRESS NOTES
HPI:   Fabian Lockwood is a 78year old female who presents for a Medicare Subsequent Annual Wellness visit (Pt already had Initial Annual Wellness).     Patient brought by dtr Jayesh Levin for annual exam.  Pt has severe dementia and oriented to person only at without help   She has difficulties Taking Meds as Rx'd based on screening of functional status. Taking medications as prescribed: Cannot do without help   She has Walking problems based on screening of functional status.    Difficulty walking?: Yes   She Hypertension     Pulmonary HTN (HCC)     Diastolic dysfunction     Snoring     Bilateral lower extremity edema     Hyperlipidemia     Tinnitus     Enlarged thyroid     Hyperthyroidism     DM2 (diabetes mellitus, type 2) (HCC)     Multinodular thyroid     P acetaminophen (TYLENOL) 325 MG Oral Tab, Take 325 mg by mouth every 6 (six) hours as needed for Pain.        MEDICAL INFORMATION:   She  has a past medical history of Anxiety (7/26/2012), Bilateral lower extremity edema (3/7/2018), Class 2 obesity due to ex 35.63 kg/m² as calculated from the following:    Height as of this encounter: 58\". Weight as of this encounter: 170 lb 8 oz (77.3 kg).     Medicare Hearing Assessment  (Required for AWV/SWV)    Hearing Screening    Screening Method: Whisper Test  Whispe months & older 0.5 ml Prefilled syringe (01523) 09/07/2017   • FLUZONE 6 months and older PFS 0.5 ml (82060) 01/31/2017        ASSESSMENT AND OTHER RELEVANT CHRONIC CONDITIONS:   Linda Tan is a 78year old female who presents for a Medicare Assessm SERVICES  INDICATIONS AND SCHEDULE Internal Lab or Procedure External Lab or Procedure   Diabetes Screening      HbgA1C   Annually Lab Results   Component Value Date    A1C 5.9 (H) 10/09/2019    No flowsheet data found.     Fasting Blood Sugar (FSB)Annually Please get every year    Pneumococcal 13 (Prevnar)  Covered Once after 65 09/10/2018 Please get once after your 65th birthday    Pneumococcal 23 (Pneumovax)  Covered Once after 65 10/09/2019 Please get once after your 65th birthday    Hepatitis B for Moder

## 2020-10-27 DIAGNOSIS — F41.9 ANXIETY: ICD-10-CM

## 2020-10-28 NOTE — TELEPHONE ENCOUNTER
Requesting Alprazolam 0.5mg  LOV: 10/15/2020 Physical  RTC: 6 months  Last Relevant Labs: 10/15/2020  Filled: 10/2/2020 #90 with 0 refills    No future appointments.     Per [place] , last dispensed 10/2/2020 #90    Rx pended and routed for approval/denial

## 2020-10-29 RX ORDER — ALPRAZOLAM 0.5 MG/1
TABLET ORAL
Qty: 90 TABLET | Refills: 2 | Status: SHIPPED | OUTPATIENT
Start: 2020-10-29 | End: 2021-01-27

## 2020-12-18 ENCOUNTER — PATIENT OUTREACH (OUTPATIENT)
Dept: CASE MANAGEMENT | Age: 80
End: 2020-12-18

## 2020-12-22 ENCOUNTER — TELEPHONE (OUTPATIENT)
Dept: FAMILY MEDICINE CLINIC | Facility: CLINIC | Age: 80
End: 2020-12-22

## 2020-12-22 NOTE — PATIENT INSTRUCTIONS
Candida Skin Infection (Adult)  Candida is type of yeast. It grows naturally on the skin and in the mouth. If it grows out of control, it can cause an infection.  Candida can cause infections in the genital area, skin folds, in the mouth, and under the br Face Mask Follow up with your healthcare provider, or as advised. Your rash will clear in 7 to 14 days. Call your healthcare provider if the rash is not gone after 14 days.   When to seek medical advice  Call your healthcare provider right away if any of these occur:

## 2020-12-22 NOTE — TELEPHONE ENCOUNTER
Patient's daughter states she has a UTI, would like the same medication she had last time since she was able to take it, please advise.

## 2020-12-23 ENCOUNTER — TELEMEDICINE (OUTPATIENT)
Dept: FAMILY MEDICINE CLINIC | Facility: CLINIC | Age: 80
End: 2020-12-23
Payer: MEDICARE

## 2020-12-23 DIAGNOSIS — R30.0 DYSURIA: Primary | ICD-10-CM

## 2020-12-23 PROCEDURE — 99441 PHONE E/M BY PHYS 5-10 MIN: CPT | Performed by: FAMILY MEDICINE

## 2020-12-23 RX ORDER — NITROFURANTOIN 25; 75 MG/1; MG/1
100 CAPSULE ORAL 2 TIMES DAILY
Qty: 14 CAPSULE | Refills: 0 | Status: SHIPPED | OUTPATIENT
Start: 2020-12-23 | End: 2020-12-30

## 2020-12-23 NOTE — TELEPHONE ENCOUNTER
Future Appointments   Date Time Provider Shayy Rajput   12/23/2020  3:30 PM Lyndon Mixon,  [facility] [address] [facility] [address]

## 2020-12-23 NOTE — PROGRESS NOTES
Virtual/Telephone Check-In    Gary Mcfadden verbally consents to a Virtual/Telephone Check-In service on 12/23/20. Patient understands and accepts financial responsibility for any deductible, co-insurance and/or co-pays associated with this service. 100 MG Oral Cap, Take 1 capsule (100 mg total) by mouth 2 (two) times daily for 7 days. , Disp: 14 capsule, Rfl: 0  •  ALPRAZOLAM 0.5 MG Oral Tab, TAKE 1 TABLET BY MOUTH 3 TIMES DAILY AS NEEDED FOR ANXIETY, Disp: 90 tablet, Rfl: 2  •  QUEtiapine Fumarate (S in detail. Questions and concerns addressed. Red flags to RTC or ED reviewed. Patient (or parent) agrees to plan.       Bhavna Raza DO  Family Medicine   12/23/2020  3:43 PM

## 2020-12-28 ENCOUNTER — PATIENT OUTREACH (OUTPATIENT)
Dept: CASE MANAGEMENT | Age: 80
End: 2020-12-28

## 2021-01-26 DIAGNOSIS — F41.9 ANXIETY: ICD-10-CM

## 2021-01-27 RX ORDER — ALPRAZOLAM 0.5 MG/1
TABLET ORAL
Qty: 90 TABLET | Refills: 2 | Status: SHIPPED | OUTPATIENT
Start: 2021-01-27

## 2021-01-27 NOTE — TELEPHONE ENCOUNTER
Requested Prescriptions     Name from pharmacy: Alprazolam 0.5 Mg Tab Acta         Will file in chart as: ALPRAZOLAM 0.5 MG Oral Tab    Sig: TAKE ONE TABLET BY MOUTH THREE TIMES A DAY AS NEEDED FOR ANXIETY     Disp:  90 tablet    Refills:  0    Start: 1/26

## 2021-02-01 DIAGNOSIS — Z23 NEED FOR VACCINATION: ICD-10-CM

## 2021-02-07 ENCOUNTER — IMMUNIZATION (OUTPATIENT)
Dept: LAB | Age: 81
End: 2021-02-07
Attending: HOSPITALIST
Payer: MEDICARE

## 2021-02-07 DIAGNOSIS — Z23 NEED FOR VACCINATION: Primary | ICD-10-CM

## 2021-02-07 PROCEDURE — 0001A SARSCOV2 VAC 30MCG/0.3ML IM: CPT

## 2021-02-28 ENCOUNTER — IMMUNIZATION (OUTPATIENT)
Dept: LAB | Age: 81
End: 2021-02-28
Attending: HOSPITALIST
Payer: MEDICARE

## 2021-02-28 DIAGNOSIS — Z23 NEED FOR VACCINATION: Primary | ICD-10-CM

## 2021-02-28 PROCEDURE — 0002A SARSCOV2 VAC 30MCG/0.3ML IM: CPT

## 2021-04-01 ENCOUNTER — MED REC SCAN ONLY (OUTPATIENT)
Dept: FAMILY MEDICINE CLINIC | Facility: CLINIC | Age: 81
End: 2021-04-01

## 2021-04-14 DIAGNOSIS — G30.1 LATE ONSET ALZHEIMER'S DISEASE WITH BEHAVIORAL DISTURBANCE (HCC): ICD-10-CM

## 2021-04-14 DIAGNOSIS — F02.81 LATE ONSET ALZHEIMER'S DISEASE WITH BEHAVIORAL DISTURBANCE (HCC): ICD-10-CM

## 2021-04-14 NOTE — TELEPHONE ENCOUNTER
Requested Prescriptions     Name from pharmacy: Quetiapine Fumarate 100 Mg Tab Nort         Will file in chart as: QUETIAPINE FUMARATE 100 MG Oral Tab    Sig: Take 1 tablet (100 mg total) by mouth 2 (two) times daily.     Disp:  180 tablet    Refills:  0

## 2021-04-14 NOTE — TELEPHONE ENCOUNTER
- 1st outreach / Left message with Sarah Rose Daughter of patient to schedule 6 month follow up for Medication refills.

## 2021-05-06 RX ORDER — QUETIAPINE 100 MG/1
100 TABLET, FILM COATED ORAL 2 TIMES DAILY
Qty: 180 TABLET | Refills: 0 | Status: SHIPPED | OUTPATIENT
Start: 2021-05-06

## 2021-06-25 ENCOUNTER — TELEPHONE (OUTPATIENT)
Dept: FAMILY MEDICINE CLINIC | Facility: CLINIC | Age: 81
End: 2021-06-25

## 2021-06-25 NOTE — TELEPHONE ENCOUNTER
Calling to get verbal authorization for home health orders for patient for OT, PT, and skilled nursing. Patient was just discharged from [facility].

## 2021-06-25 NOTE — TELEPHONE ENCOUNTER
Spoke with Fransisca Teague at [facility], verbal given that Dr. Sundeep Daniel will sign off on [facility] orders. She will fax orders for signature.

## 2021-11-08 ENCOUNTER — TELEPHONE (OUTPATIENT)
Dept: FAMILY MEDICINE CLINIC | Facility: CLINIC | Age: 81
End: 2021-11-08

## 2021-11-08 NOTE — TELEPHONE ENCOUNTER
· Called pt to schedule medicare annual well visit  · Spoke to daughter  · Pt is a at [facility]   · Daughter will talk to the family to see if/when she can come in

## 2021-11-09 ENCOUNTER — TELEPHONE (OUTPATIENT)
Dept: FAMILY MEDICINE CLINIC | Facility: CLINIC | Age: 81
End: 2021-11-09

## 2021-11-09 NOTE — TELEPHONE ENCOUNTER
Spoke to patient's daughter, Mirtha Dawkins, who states that pt is at [facility] of her care is done in house-she does not leave the facility. Pt will not be coming back to the office.

## 2023-08-10 NOTE — TELEPHONE ENCOUNTER
Erp at bedside   Patients  came to office and states that Meloxicam script was given to them at last OV but they lost the script. Requesting it be resent to [address] [place] [address] Avenue sent, patient notified.

## (undated) NOTE — MR AVS SNAPSHOT
[facility] [address]  [place]  [phone]               Thank you for choosing us for your health care visit with Shital Flores MD.  We are glad to serve you and happy to provide you with [facility] Screening for lipid disorders          Instructions and Information about Your Health     None      Allergies as of May 09, 2017     Cortisone Swelling    Metronidazole     Rash                  Today's Vital Signs     BP Pulse Temp Height Weight BMI    1 You can get these medications from any pharmacy     Bring a paper prescription for each of these medications    - ALPRAZolam 0.5 MG Tabs  - Meloxicam 15 MG Tabs  - Pantoprazole Sodium 40 MG Tbec            [facility]               Educational Information

## (undated) NOTE — ED AVS SNAPSHOT
Basia Pena   MRN: [[id number]]    Department:  THE [facility] Emergency Department in [place]   Date of Visit:  11/21/2019           Disclosure     Insurance plans vary and the physician(s) referred by the ER may not be covered by your plan.  Please con tell this physician (or your personal doctor if your instructions are to return to your personal doctor) about any new or lasting problems. The primary care or specialist physician will see patients referred from the [facility] Emergency Department.  Sandie Clayton

## (undated) NOTE — MR AVS SNAPSHOT
[address]  [facility]  [phone]               Thank you for choosing us for your health care visit with Mo Morales MD.  We are glad to serve you and happy to provide you with [facility] Take 1 tablet (15 mg total) by mouth daily.            Pantoprazole Sodium 40 MG Tbec   TAKE ONE TABLET BY MOUTH EVERY MORNING BEFORE BREAKFAST   Commonly known as:  PROTONIX                Where to Get Your Medications      You can get these medications fr Don’t forget strength training with weights and resistance Set goals and track your progress   You don’t need to join a gym. Home exercises work great.  Put more priority on exercise in your life                    Visit [facility] online at

## (undated) NOTE — ED AVS SNAPSHOT
[facility] Emergency Department in [address] [facility]    Phone:  [phone]    Fax:  Demi Mcfadden   MRN: [[id number]]    Department:  [facility] Emergency Department in [place]   Date of Visi If you have any problems with your follow-up, please call our  at [phone]    Si usted tiene algun problema con valdez sequimiento, por favor llame a nuestro adminstrador de casos al [phone]    Expect to receive an electronic request Lisa Hodge [address] ([address]) Marquise ([address] [address] [facility]9   [facility] [address] ([address] [facility]) [address]  (Do

## (undated) NOTE — MR AVS SNAPSHOT
[facility] [address]  [facility]  [phone]               Thank you for choosing us for your health care visit with Martina Sanchez MD.  We are glad to serve you and happy to provide you with [facility] TAKE ONE TABLET BY MOUTH EVERY MORNING BEFORE BREAKFAST   Commonly known as:  Isidoro German 26                Where to Get Your Medications      You can get these medications from any pharmacy     Bring a paper prescription for each of these medications    - rupa

## (undated) NOTE — MR AVS SNAPSHOT
[facility] [address]  [facility]  [phone]               Thank you for choosing us for your health care visit with Julia Grigsby PA-C.   We are glad to serve you and happy to provide you •Walk in Clinic in [place] at [facility].  [address] Mon-Fri at 8am-7:30pm, and Sat/Sun 9am-430pm  Also at [address]  Call [phone] for info    • Please call our office about any questions regarding your treatment/medicines/tests as a re Return if symptoms worsen or fail to improve.       Your Appointments     Jan 31, 2017  9:00 AM   Exam - Established Patient with MD Kristy Pickering, [address] [facility] ([facility])    [address] [place] [address]. return for a follow-up Blood Pressure Check in 1 year.      Lifestyle Modification Recommendations:    Modification Recommendation   Weight Reduction Maintain normal body weight (body mass index 18.5-24.9 kg/m2)   DASH eating plan Adopt a diet rich in fruit

## (undated) NOTE — ED AVS SNAPSHOT
THE [facility] Emergency Department in [address]    Phone:  [phone]    Fax:  Ciaran Mcfadden   MRN: [[id number]]    Department:  THE [facility] Emergency Department in [place]   Date of Visi IF THERE IS ANY CHANGE OR WORSENING OF YOUR CONDITION, CALL YOUR PRIMARY CARE PHYSICIAN AT ONCE OR RETURN IMMEDIATELY TO THE EMERGENCY DEPARTMENT.     If you have been prescribed any medication(s), please fill your prescription right away and begin taking t